# Patient Record
Sex: FEMALE | Race: WHITE | Employment: UNEMPLOYED | URBAN - METROPOLITAN AREA
[De-identification: names, ages, dates, MRNs, and addresses within clinical notes are randomized per-mention and may not be internally consistent; named-entity substitution may affect disease eponyms.]

---

## 2018-07-04 ENCOUNTER — APPOINTMENT (OUTPATIENT)
Dept: GENERAL RADIOLOGY | Age: 48
End: 2018-07-04
Payer: MEDICAID

## 2018-07-04 ENCOUNTER — HOSPITAL ENCOUNTER (EMERGENCY)
Age: 48
Discharge: HOME | End: 2018-07-04
Payer: MEDICAID

## 2018-07-04 VITALS
DIASTOLIC BLOOD PRESSURE: 79 MMHG | BODY MASS INDEX: 41.59 KG/M2 | HEART RATE: 80 BPM | HEIGHT: 62 IN | OXYGEN SATURATION: 96 % | SYSTOLIC BLOOD PRESSURE: 128 MMHG | WEIGHT: 226 LBS | TEMPERATURE: 97.9 F | RESPIRATION RATE: 18 BRPM

## 2018-07-04 DIAGNOSIS — M72.2 PLANTAR FASCIITIS OF LEFT FOOT: ICD-10-CM

## 2018-07-04 DIAGNOSIS — M79.672 PAIN OF LEFT HEEL: ICD-10-CM

## 2018-07-04 DIAGNOSIS — M77.32 HEEL SPUR, LEFT: Primary | ICD-10-CM

## 2018-07-04 PROCEDURE — 6360000002 HC RX W HCPCS

## 2018-07-04 PROCEDURE — 73610 X-RAY EXAM OF ANKLE: CPT

## 2018-07-04 PROCEDURE — 96372 THER/PROPH/DIAG INJ SC/IM: CPT

## 2018-07-04 PROCEDURE — 99283 EMERGENCY DEPT VISIT LOW MDM: CPT

## 2018-07-04 RX ORDER — KETOROLAC TROMETHAMINE 30 MG/ML
30 INJECTION, SOLUTION INTRAMUSCULAR; INTRAVENOUS ONCE
Status: COMPLETED | OUTPATIENT
Start: 2018-07-04 | End: 2018-07-04

## 2018-07-04 RX ORDER — KETOROLAC TROMETHAMINE 30 MG/ML
INJECTION, SOLUTION INTRAMUSCULAR; INTRAVENOUS
Status: COMPLETED
Start: 2018-07-04 | End: 2018-07-04

## 2018-07-04 RX ORDER — NAPROXEN 500 MG/1
500 TABLET ORAL 2 TIMES DAILY
Qty: 14 TABLET | Refills: 0 | Status: SHIPPED | OUTPATIENT
Start: 2018-07-04 | End: 2018-07-11

## 2018-07-04 RX ADMIN — KETOROLAC TROMETHAMINE 30 MG: 30 INJECTION, SOLUTION INTRAMUSCULAR at 18:22

## 2018-07-04 RX ADMIN — KETOROLAC TROMETHAMINE 30 MG: 30 INJECTION, SOLUTION INTRAMUSCULAR; INTRAVENOUS at 18:22

## 2018-07-04 ASSESSMENT — PAIN DESCRIPTION - LOCATION: LOCATION: LEG

## 2018-07-04 ASSESSMENT — PAIN DESCRIPTION - ONSET: ONSET: ON-GOING

## 2018-07-04 ASSESSMENT — PAIN DESCRIPTION - DESCRIPTORS: DESCRIPTORS: ACHING;CONSTANT;DISCOMFORT

## 2018-07-04 ASSESSMENT — PAIN SCALES - GENERAL
PAINLEVEL_OUTOF10: 9
PAINLEVEL_OUTOF10: 9

## 2018-07-04 ASSESSMENT — PAIN DESCRIPTION - PROGRESSION: CLINICAL_PROGRESSION: NOT CHANGED

## 2018-07-04 ASSESSMENT — PAIN DESCRIPTION - PAIN TYPE: TYPE: ACUTE PAIN

## 2018-07-04 ASSESSMENT — PAIN DESCRIPTION - ORIENTATION: ORIENTATION: LEFT

## 2018-07-04 ASSESSMENT — PAIN DESCRIPTION - FREQUENCY: FREQUENCY: CONTINUOUS

## 2018-07-04 NOTE — ED PROVIDER NOTES
[facility]     Department of Emergency Medicine   ED  Provider Note  Admit Date/RoomTime: 7/4/2018  5:17 PM  ED Room: 37/37   Chief Complaint:   Foot Pain (L heel pain that radiates into knee, has been going for a month)    History of Present Illness   Source of history provided by:  patient. History/Exam Limitations: none. Ibeth Hill is a 50 y.o. old female presenting to the emergency department by private vehicle, for Left heel pain which occured 1 month(s) prior to arrival.  Cause of complaint: unknown. She states that at work one month ago she did wear really loose boots and felt the heel hit against her own heel multiple times throughout the day extremity hard. She thought this may have elicited the pain. She also states that she just moved in with her parents and has to walk up and down a lot of steps. She states that this has been aggravating the pain. There has been a history of no prior problems with this area in the past.  Since onset the symptoms have been mild in degree with ability to bear weight, but with some pain. Her pain is aggraveated by stairs and relieved by nothing. Patient states that she has tried Toradol, Naprosyn, and diclofenac with no alleviation of the pain. Patient denies any recent travel, recent surgery, calf pain or swelling, history of blood clots, shortness of breath, fever, chills, diabetes. ROS    Pertinent positives and negatives are stated within HPI, all other systems reviewed and are negative. Past Surgical History:   Procedure Laterality Date    LYMPH NODE DISSECTION  2010    back    TONSILLECTOMY      TOOTH EXTRACTION  2011   Social History:  reports that she quit smoking about 3 years ago. Her smoking use included Cigarettes. She has a 5.00 pack-year smoking history. She has never used smokeless tobacco. She reports that she does not drink alcohol or use drugs. Family History: family history is not on file.    Allergies: Patient has no known allergies. Physical Exam           ED Triage Vitals [07/04/18 1745]   BP Temp Temp Source Pulse Resp SpO2 Height Weight   128/79 97.9 °F (36.6 °C) Oral 80 18 96 % 5' 2\" (1.575 m) 226 lb (102.5 kg)       Oxygen Saturation Interpretation: Normal.    Constitutional:  Alert, development consistent with age. Neck:  Normal ROM. Supple. Ankle:  Left plantar aspect of heel:              Tenderness:  mild. Swelling: None. Deformity: no.             ROM: full range of motion. Skin:  Firm, tough skin to heel without signs of infection, erythema, increased warmth, deformity, callus, ulcer. Neurovascular: Motor deficit: none. Sensory deficit:   none. Pulse deficit: none. Capillary refill: normal.  Knee:  No concerns for DVT on physical examination            Tenderness:  none. Swelling: None. Deformity: no.             ROM: full range of motion. Skin:  no erythema, rash or wounds noted. Foot:              Tenderness:  none. Swelling: None. Deformity: no.             ROM: full range of motion. Skin:  no erythema, rash or wounds noted. Gait:  normal.   Lymphatics: No lymphangitis or adenopathy noted. Neurological:  Oriented. Motor functions intact. Lab / Imaging Results   (All laboratory and radiology results have been personally reviewed by myself)  Labs:  No results found for this visit on 07/04/18. Imaging: All Radiology results interpreted by Radiologist unless otherwise noted. XR ANKLE LEFT (MIN 3 VIEWS)   Final Result   1. No acute fracture or dislocation. ED Course / Medical Decision Making     Medications   ketorolac (TORADOL) injection 30 mg (30 mg Intramuscular Given 7/4/18 5122)        Consults:   None    Procedure(s):   none    MDM:      Patient presents to emergency room for left heel pain for the past month.  She is unsure of

## 2020-11-10 ENCOUNTER — HOSPITAL ENCOUNTER (EMERGENCY)
Age: 50
Discharge: LEFT BEFORE DISCHARGE | End: 2020-11-11

## 2020-11-10 VITALS
WEIGHT: 195 LBS | TEMPERATURE: 97.8 F | SYSTOLIC BLOOD PRESSURE: 127 MMHG | HEART RATE: 88 BPM | OXYGEN SATURATION: 96 % | DIASTOLIC BLOOD PRESSURE: 65 MMHG | BODY MASS INDEX: 35.88 KG/M2 | HEIGHT: 62 IN | RESPIRATION RATE: 16 BRPM

## 2020-11-10 ASSESSMENT — PAIN DESCRIPTION - FREQUENCY: FREQUENCY: CONTINUOUS

## 2020-11-10 ASSESSMENT — PAIN DESCRIPTION - ORIENTATION: ORIENTATION: LEFT

## 2020-11-10 ASSESSMENT — PAIN DESCRIPTION - DESCRIPTORS: DESCRIPTORS: ACHING

## 2020-11-10 ASSESSMENT — PAIN DESCRIPTION - PAIN TYPE: TYPE: ACUTE PAIN

## 2020-11-10 ASSESSMENT — PAIN SCALES - GENERAL: PAINLEVEL_OUTOF10: 10

## 2020-11-10 ASSESSMENT — PAIN DESCRIPTION - LOCATION: LOCATION: THROAT

## 2020-11-10 ASSESSMENT — PAIN - FUNCTIONAL ASSESSMENT: PAIN_FUNCTIONAL_ASSESSMENT: PREVENTS OR INTERFERES SOME ACTIVE ACTIVITIES AND ADLS

## 2021-02-03 ENCOUNTER — HOSPITAL ENCOUNTER (OUTPATIENT)
Dept: CT IMAGING | Age: 51
Discharge: HOME | End: 2021-02-05
Payer: MEDICAID

## 2021-02-03 DIAGNOSIS — S09.90XA INJURY OF HEAD, INITIAL ENCOUNTER: ICD-10-CM

## 2021-02-03 PROCEDURE — 70450 CT HEAD/BRAIN W/O DYE: CPT

## 2021-02-03 PROCEDURE — 72125 CT NECK SPINE W/O DYE: CPT

## 2021-02-03 PROCEDURE — 70486 CT MAXILLOFACIAL W/O DYE: CPT

## 2022-02-16 ENCOUNTER — APPOINTMENT (OUTPATIENT)
Dept: CT IMAGING | Age: 52
End: 2022-02-16
Payer: MEDICARE

## 2022-02-16 ENCOUNTER — HOSPITAL ENCOUNTER (EMERGENCY)
Age: 52
Discharge: HOME | End: 2022-02-16
Attending: EMERGENCY MEDICINE
Payer: MEDICARE

## 2022-02-16 VITALS
OXYGEN SATURATION: 96 % | DIASTOLIC BLOOD PRESSURE: 79 MMHG | BODY MASS INDEX: 36.8 KG/M2 | HEART RATE: 80 BPM | TEMPERATURE: 98.2 F | WEIGHT: 200 LBS | RESPIRATION RATE: 20 BRPM | SYSTOLIC BLOOD PRESSURE: 133 MMHG | HEIGHT: 62 IN

## 2022-02-16 DIAGNOSIS — F91.9 BEHAVIOR DISTURBANCE: ICD-10-CM

## 2022-02-16 DIAGNOSIS — R91.8 LUNG NODULES: ICD-10-CM

## 2022-02-16 DIAGNOSIS — R07.9 CHEST PAIN, UNSPECIFIED TYPE: Primary | ICD-10-CM

## 2022-02-16 LAB
ACETAMINOPHEN LEVEL: <5 MCG/ML (ref 10–30)
ALBUMIN SERPL-MCNC: 4 G/DL (ref 3.5–5.2)
ALP BLD-CCNC: 94 U/L (ref 35–104)
ALT SERPL-CCNC: 19 U/L (ref 0–32)
AMPHETAMINE SCREEN, URINE: NOT DETECTED
ANION GAP SERPL CALCULATED.3IONS-SCNC: 12 MMOL/L (ref 7–16)
AST SERPL-CCNC: 15 U/L (ref 0–31)
BARBITURATE SCREEN URINE: NOT DETECTED
BASOPHILS ABSOLUTE: 0.04 E9/L (ref 0–0.2)
BASOPHILS RELATIVE PERCENT: 0.4 % (ref 0–2)
BENZODIAZEPINE SCREEN, URINE: NOT DETECTED
BILIRUB SERPL-MCNC: <0.2 MG/DL (ref 0–1.2)
BUN BLDV-MCNC: 8 MG/DL (ref 6–20)
CALCIUM SERPL-MCNC: 9.5 MG/DL (ref 8.6–10.2)
CANNABINOID SCREEN URINE: NOT DETECTED
CHLORIDE BLD-SCNC: 108 MMOL/L (ref 98–107)
CO2: 24 MMOL/L (ref 22–29)
COCAINE METABOLITE SCREEN URINE: POSITIVE
CREAT SERPL-MCNC: 0.8 MG/DL (ref 0.5–1)
EKG ATRIAL RATE: 66 BPM
EKG P AXIS: 71 DEGREES
EKG P-R INTERVAL: 176 MS
EKG Q-T INTERVAL: 422 MS
EKG QRS DURATION: 86 MS
EKG QTC CALCULATION (BAZETT): 442 MS
EKG R AXIS: 86 DEGREES
EKG T AXIS: 60 DEGREES
EKG VENTRICULAR RATE: 66 BPM
EOSINOPHILS ABSOLUTE: 0.28 E9/L (ref 0.05–0.5)
EOSINOPHILS RELATIVE PERCENT: 2.8 % (ref 0–6)
ETHANOL: <10 MG/DL (ref 0–0.08)
FENTANYL SCREEN, URINE: NOT DETECTED
GFR AFRICAN AMERICAN: >60
GFR NON-AFRICAN AMERICAN: >60 ML/MIN/1.73
GLUCOSE BLD-MCNC: 203 MG/DL (ref 74–99)
HCG, URINE, POC: NEGATIVE
HCT VFR BLD CALC: 45 % (ref 34–48)
HEMOGLOBIN: 14.5 G/DL (ref 11.5–15.5)
IMMATURE GRANULOCYTES #: 0.04 E9/L
IMMATURE GRANULOCYTES %: 0.4 % (ref 0–5)
INFLUENZA A: NOT DETECTED
INFLUENZA B: NOT DETECTED
LYMPHOCYTES ABSOLUTE: 1.45 E9/L (ref 1.5–4)
LYMPHOCYTES RELATIVE PERCENT: 14.6 % (ref 20–42)
Lab: ABNORMAL
Lab: NORMAL
MCH RBC QN AUTO: 28.9 PG (ref 26–35)
MCHC RBC AUTO-ENTMCNC: 32.2 % (ref 32–34.5)
MCV RBC AUTO: 89.8 FL (ref 80–99.9)
METHADONE SCREEN, URINE: NOT DETECTED
MONOCYTES ABSOLUTE: 0.61 E9/L (ref 0.1–0.95)
MONOCYTES RELATIVE PERCENT: 6.1 % (ref 2–12)
NEGATIVE QC PASS/FAIL: NORMAL
NEUTROPHILS ABSOLUTE: 7.5 E9/L (ref 1.8–7.3)
NEUTROPHILS RELATIVE PERCENT: 75.7 % (ref 43–80)
OPIATE SCREEN URINE: NOT DETECTED
OXYCODONE URINE: NOT DETECTED
PDW BLD-RTO: 13.2 FL (ref 11.5–15)
PHENCYCLIDINE SCREEN URINE: NOT DETECTED
PLATELET # BLD: 295 E9/L (ref 130–450)
PMV BLD AUTO: 9.6 FL (ref 7–12)
POSITIVE QC PASS/FAIL: NORMAL
POTASSIUM SERPL-SCNC: 3.8 MMOL/L (ref 3.5–5)
PRO-BNP: 64 PG/ML (ref 0–125)
RBC # BLD: 5.01 E12/L (ref 3.5–5.5)
SALICYLATE, SERUM: <0.3 MG/DL (ref 0–30)
SARS-COV-2 RNA, RT PCR: NOT DETECTED
SODIUM BLD-SCNC: 144 MMOL/L (ref 132–146)
TOTAL CK: 133 U/L (ref 20–180)
TOTAL PROTEIN: 7.2 G/DL (ref 6.4–8.3)
TRICYCLIC ANTIDEPRESSANTS SCREEN SERUM: NEGATIVE NG/ML
TROPONIN, HIGH SENSITIVITY: 7 NG/L (ref 0–9)
TROPONIN, HIGH SENSITIVITY: <6 NG/L (ref 0–9)
WBC # BLD: 9.9 E9/L (ref 4.5–11.5)

## 2022-02-16 PROCEDURE — 84484 ASSAY OF TROPONIN QUANT: CPT

## 2022-02-16 PROCEDURE — 80053 COMPREHEN METABOLIC PANEL: CPT

## 2022-02-16 PROCEDURE — 83880 ASSAY OF NATRIURETIC PEPTIDE: CPT

## 2022-02-16 PROCEDURE — 85025 COMPLETE CBC W/AUTO DIFF WBC: CPT

## 2022-02-16 PROCEDURE — 6360000004 HC RX CONTRAST MEDICATION: Performed by: RADIOLOGY

## 2022-02-16 PROCEDURE — 74174 CTA ABD&PLVS W/CONTRAST: CPT

## 2022-02-16 PROCEDURE — 70450 CT HEAD/BRAIN W/O DYE: CPT

## 2022-02-16 PROCEDURE — 87636 SARSCOV2 & INF A&B AMP PRB: CPT

## 2022-02-16 PROCEDURE — 80307 DRUG TEST PRSMV CHEM ANLYZR: CPT

## 2022-02-16 PROCEDURE — 2580000003 HC RX 258: Performed by: RADIOLOGY

## 2022-02-16 PROCEDURE — 93010 ELECTROCARDIOGRAM REPORT: CPT | Performed by: INTERNAL MEDICINE

## 2022-02-16 PROCEDURE — 71275 CT ANGIOGRAPHY CHEST: CPT

## 2022-02-16 PROCEDURE — 80179 DRUG ASSAY SALICYLATE: CPT

## 2022-02-16 PROCEDURE — 93005 ELECTROCARDIOGRAM TRACING: CPT | Performed by: NURSE PRACTITIONER

## 2022-02-16 PROCEDURE — 82550 ASSAY OF CK (CPK): CPT

## 2022-02-16 PROCEDURE — 80143 DRUG ASSAY ACETAMINOPHEN: CPT

## 2022-02-16 PROCEDURE — 82077 ASSAY SPEC XCP UR&BREATH IA: CPT

## 2022-02-16 PROCEDURE — 36415 COLL VENOUS BLD VENIPUNCTURE: CPT

## 2022-02-16 PROCEDURE — 99284 EMERGENCY DEPT VISIT MOD MDM: CPT

## 2022-02-16 RX ORDER — SODIUM CHLORIDE 0.9 % (FLUSH) 0.9 %
10 SYRINGE (ML) INJECTION ONCE
Status: COMPLETED | OUTPATIENT
Start: 2022-02-16 | End: 2022-02-16

## 2022-02-16 RX ADMIN — SODIUM CHLORIDE, PRESERVATIVE FREE 10 ML: 5 INJECTION INTRAVENOUS at 16:19

## 2022-02-16 RX ADMIN — IOPAMIDOL 100 ML: 755 INJECTION, SOLUTION INTRAVENOUS at 16:18

## 2022-02-16 ASSESSMENT — ENCOUNTER SYMPTOMS
COUGH: 0
ABDOMINAL DISTENTION: 0
VOMITING: 0
EYE PAIN: 0
DIARRHEA: 0
ABDOMINAL PAIN: 1
BACK PAIN: 0
SORE THROAT: 0
SHORTNESS OF BREATH: 0
NAUSEA: 0

## 2022-02-16 NOTE — ED NOTES
Emergency Department [facility] AN AFFILIATE OF [facility] Biopsychosocial Assessment Note    Chief Complaint: Pt is a 45 y/o female presenting to the ED for medical issues. Once evaluated medical, SW was consulted to complete a psychiatric evaluation. Per ED physician note, \"She also states she is hearing voices. They state they are bad voices telling her things. They tell her to \"jump\". She states did not tell her to kill herself with a tell her to run around and jump off of things. She states nobody cares about her and that is why she feels like the voices are now talking to her. She says she also sees random she running around everywhere. She has not slept in 2 days. \"    MSE: Pt alert and oriented x 4, cooperative, mood depressed, tearful, affect congruent, thought processes clear, normal speech pattern; eye contact good. Pt admits to vague SI. Pt denies HI. Pt admits to [address]. Clinical Summary/History:     Pt states that she lives with her mom and that she is dying of cancer. She states she is depressed because nobody loves her and nobody cares what happens to her. She reports sometimes these feelings intensify and that is when she hears gets paranoid and begins having both auditory and visual hallucinations. Pt states that sometimes she sees shadows and sometimes she sees sheep. She states the voices in her head tell her \"to run and jump\". She states that when these symptoms intensify she gets chest pain and that is why she is in the Emergency room. Pt states that sometimes \"I just want to get out of life\". Pt admits to fleeting and vague suicidal ideations. Per police officers' report, who are by pt's bedside, police were called to the scene by pt's sister who was concerned for her mother's well-being. According to the police officers, pt was heard threatening to kill her mother. The  states, \"The officer heard her say I am going to kill her\". Pt denies that she ever said this.      Pt denies any previous psychiatric hospitalizations and/or history of any suicidal ideations, suicide attempts and/or self-injurious behaviors. Pt states she does get prescribed Effexor for her Borderline Personality Disorder and has been off her medications for about one month. It is recommended that pt be placed on suicide watch once she returns to assisted for precautionary measures. It is also recommended, that should pt be released from assisted within the next three days, that pt will need re-evaluated by the resident psychiatrist within the incarceration system prior to being released. Gender  [] Male [x] Female [] Transgender  [] Other    Sexual Orientation    [x] Heterosexual [] Homosexual [] Bisexual [] Other    Suicidal Behavioral: CSSR-S Complete. [x] Reports:    [] Past [x] Present   [] Denies    Homicidal/ Violent Behavior  [] Reports:   [] Past [] Present   [x] Denies     Violence Risk Screenin. Have you ever engaged in a physical fight? [x]  Yes []  No  2. Have you ever had an order of protection taken out against you? [x]  Yes []  No  3. Have you ever been arrested due to violence? [x]  Yes []  No  4. Have you ever been cruel to animals? [x]  Yes []  No    If any of the above questions are affirmative, please complete these questions:  1. Have you ever thought about hurting someone? []  No  [x]  Yes (Ask the questions listed below)   When?  Did you follow through with the thoughts? []  No  []  Yes - What happened? 2.  Have you ever threatened anyone? []  No  [x]  Yes (Ask the questions listed below)   When and what happened?  Have you ever threatened someone with a gun, knife or other weapon? []  No  []  Yes - When and what happened? 3. Have you ever physically hurt someone? []  No  [x]  Yes (Ask the questions listed below)   When and what happened?     How many times have you physically hurt someone in the past?    Hallucinations/Delusions   [] Reports:   [x] Denies     Substance Use/Alcohol Use/Addiction: SBIRT Screen Complete.    [] Reports:   [x] Denies     Trauma History  [] Reports:  [x] Denies     Collateral Information:       Level of Care/Disposition Plan  [] Home:   [] Outpatient Provider:   [] Crisis Unit:   [] Inpatient Psychiatric Unit:  [x] Other: CHACHA Jensen, [facility] [place]  02/16/22 [address] [place], [place] [address]ACHA, [place]  02/16/22 6678

## 2022-08-13 ENCOUNTER — HOSPITAL ENCOUNTER (EMERGENCY)
Age: 52
Discharge: HOME | End: 2022-08-13
Attending: STUDENT IN AN ORGANIZED HEALTH CARE EDUCATION/TRAINING PROGRAM
Payer: MEDICARE

## 2022-08-13 ENCOUNTER — APPOINTMENT (OUTPATIENT)
Dept: GENERAL RADIOLOGY | Age: 52
End: 2022-08-13
Payer: MEDICARE

## 2022-08-13 VITALS
OXYGEN SATURATION: 94 % | BODY MASS INDEX: 34.96 KG/M2 | HEART RATE: 81 BPM | SYSTOLIC BLOOD PRESSURE: 108 MMHG | DIASTOLIC BLOOD PRESSURE: 70 MMHG | TEMPERATURE: 98.4 F | HEIGHT: 62 IN | RESPIRATION RATE: 16 BRPM | WEIGHT: 190 LBS

## 2022-08-13 DIAGNOSIS — U07.1 COVID-19: ICD-10-CM

## 2022-08-13 DIAGNOSIS — J18.9 PNEUMONIA OF RIGHT LUNG DUE TO INFECTIOUS ORGANISM, UNSPECIFIED PART OF LUNG: Primary | ICD-10-CM

## 2022-08-13 LAB
ALBUMIN SERPL-MCNC: 3.5 G/DL (ref 3.5–5.2)
ALP BLD-CCNC: 145 U/L (ref 35–104)
ALT SERPL-CCNC: 122 U/L (ref 0–32)
ANION GAP SERPL CALCULATED.3IONS-SCNC: 14 MMOL/L (ref 7–16)
AST SERPL-CCNC: 62 U/L (ref 0–31)
BASOPHILS ABSOLUTE: 0.05 E9/L (ref 0–0.2)
BASOPHILS RELATIVE PERCENT: 0.4 % (ref 0–2)
BILIRUB SERPL-MCNC: 0.3 MG/DL (ref 0–1.2)
BILIRUBIN DIRECT: <0.2 MG/DL (ref 0–0.3)
BILIRUBIN, INDIRECT: ABNORMAL MG/DL (ref 0–1)
BUN BLDV-MCNC: 9 MG/DL (ref 6–20)
CALCIUM SERPL-MCNC: 10 MG/DL (ref 8.6–10.2)
CHLORIDE BLD-SCNC: 92 MMOL/L (ref 98–107)
CO2: 30 MMOL/L (ref 22–29)
CREAT SERPL-MCNC: 0.7 MG/DL (ref 0.5–1)
EKG ATRIAL RATE: 77 BPM
EKG P AXIS: 39 DEGREES
EKG P-R INTERVAL: 192 MS
EKG Q-T INTERVAL: 388 MS
EKG QRS DURATION: 88 MS
EKG QTC CALCULATION (BAZETT): 439 MS
EKG R AXIS: 49 DEGREES
EKG T AXIS: 57 DEGREES
EKG VENTRICULAR RATE: 77 BPM
EOSINOPHILS ABSOLUTE: 0.18 E9/L (ref 0.05–0.5)
EOSINOPHILS RELATIVE PERCENT: 1.5 % (ref 0–6)
GFR AFRICAN AMERICAN: >60
GFR NON-AFRICAN AMERICAN: >60 ML/MIN/1.73
GLUCOSE BLD-MCNC: 181 MG/DL (ref 74–99)
HCT VFR BLD CALC: 40 % (ref 34–48)
HEMOGLOBIN: 12.9 G/DL (ref 11.5–15.5)
IMMATURE GRANULOCYTES #: 0.15 E9/L
IMMATURE GRANULOCYTES %: 1.2 % (ref 0–5)
LACTIC ACID: 2.5 MMOL/L (ref 0.5–2.2)
LIPASE: 29 U/L (ref 13–60)
LYMPHOCYTES ABSOLUTE: 1.62 E9/L (ref 1.5–4)
LYMPHOCYTES RELATIVE PERCENT: 13.2 % (ref 20–42)
MAGNESIUM: 2 MG/DL (ref 1.6–2.6)
MCH RBC QN AUTO: 28.5 PG (ref 26–35)
MCHC RBC AUTO-ENTMCNC: 32.3 % (ref 32–34.5)
MCV RBC AUTO: 88.5 FL (ref 80–99.9)
MONOCYTES ABSOLUTE: 0.63 E9/L (ref 0.1–0.95)
MONOCYTES RELATIVE PERCENT: 5.1 % (ref 2–12)
NEUTROPHILS ABSOLUTE: 9.66 E9/L (ref 1.8–7.3)
NEUTROPHILS RELATIVE PERCENT: 78.6 % (ref 43–80)
PDW BLD-RTO: 12.8 FL (ref 11.5–15)
PLATELET # BLD: 429 E9/L (ref 130–450)
PMV BLD AUTO: 9 FL (ref 7–12)
POTASSIUM REFLEX MAGNESIUM: 3.4 MMOL/L (ref 3.5–5)
PRO-BNP: 98 PG/ML (ref 0–125)
RBC # BLD: 4.52 E12/L (ref 3.5–5.5)
SODIUM BLD-SCNC: 136 MMOL/L (ref 132–146)
TOTAL PROTEIN: 7.3 G/DL (ref 6.4–8.3)
TROPONIN, HIGH SENSITIVITY: 7 NG/L (ref 0–9)
WBC # BLD: 12.3 E9/L (ref 4.5–11.5)

## 2022-08-13 PROCEDURE — 83735 ASSAY OF MAGNESIUM: CPT

## 2022-08-13 PROCEDURE — 84484 ASSAY OF TROPONIN QUANT: CPT

## 2022-08-13 PROCEDURE — 83690 ASSAY OF LIPASE: CPT

## 2022-08-13 PROCEDURE — 96365 THER/PROPH/DIAG IV INF INIT: CPT

## 2022-08-13 PROCEDURE — 2580000003 HC RX 258: Performed by: STUDENT IN AN ORGANIZED HEALTH CARE EDUCATION/TRAINING PROGRAM

## 2022-08-13 PROCEDURE — 96375 TX/PRO/DX INJ NEW DRUG ADDON: CPT

## 2022-08-13 PROCEDURE — 94640 AIRWAY INHALATION TREATMENT: CPT

## 2022-08-13 PROCEDURE — 80076 HEPATIC FUNCTION PANEL: CPT

## 2022-08-13 PROCEDURE — 93005 ELECTROCARDIOGRAM TRACING: CPT | Performed by: STUDENT IN AN ORGANIZED HEALTH CARE EDUCATION/TRAINING PROGRAM

## 2022-08-13 PROCEDURE — 83880 ASSAY OF NATRIURETIC PEPTIDE: CPT

## 2022-08-13 PROCEDURE — 83605 ASSAY OF LACTIC ACID: CPT

## 2022-08-13 PROCEDURE — 6360000002 HC RX W HCPCS: Performed by: STUDENT IN AN ORGANIZED HEALTH CARE EDUCATION/TRAINING PROGRAM

## 2022-08-13 PROCEDURE — 94664 DEMO&/EVAL PT USE INHALER: CPT

## 2022-08-13 PROCEDURE — 85025 COMPLETE CBC W/AUTO DIFF WBC: CPT

## 2022-08-13 PROCEDURE — 80048 BASIC METABOLIC PNL TOTAL CA: CPT

## 2022-08-13 PROCEDURE — 2500000003 HC RX 250 WO HCPCS: Performed by: STUDENT IN AN ORGANIZED HEALTH CARE EDUCATION/TRAINING PROGRAM

## 2022-08-13 PROCEDURE — 71045 X-RAY EXAM CHEST 1 VIEW: CPT

## 2022-08-13 PROCEDURE — 6370000000 HC RX 637 (ALT 250 FOR IP): Performed by: STUDENT IN AN ORGANIZED HEALTH CARE EDUCATION/TRAINING PROGRAM

## 2022-08-13 PROCEDURE — 99285 EMERGENCY DEPT VISIT HI MDM: CPT

## 2022-08-13 RX ORDER — 0.9 % SODIUM CHLORIDE 0.9 %
1000 INTRAVENOUS SOLUTION INTRAVENOUS ONCE
Status: COMPLETED | OUTPATIENT
Start: 2022-08-13 | End: 2022-08-13

## 2022-08-13 RX ORDER — BENZONATATE 100 MG/1
100 CAPSULE ORAL 3 TIMES DAILY PRN
Qty: 30 CAPSULE | Refills: 0 | Status: SHIPPED | OUTPATIENT
Start: 2022-08-13 | End: 2022-08-23

## 2022-08-13 RX ORDER — IPRATROPIUM BROMIDE AND ALBUTEROL SULFATE 2.5; .5 MG/3ML; MG/3ML
3 SOLUTION RESPIRATORY (INHALATION) ONCE
Status: COMPLETED | OUTPATIENT
Start: 2022-08-13 | End: 2022-08-13

## 2022-08-13 RX ORDER — POTASSIUM CHLORIDE 1.5 G/1.77G
40 POWDER, FOR SOLUTION ORAL ONCE
Status: DISCONTINUED | OUTPATIENT
Start: 2022-08-13 | End: 2022-08-13 | Stop reason: CLARIF

## 2022-08-13 RX ORDER — BENZONATATE 100 MG/1
100 CAPSULE ORAL ONCE
Status: COMPLETED | OUTPATIENT
Start: 2022-08-13 | End: 2022-08-13

## 2022-08-13 RX ORDER — DOXYCYCLINE HYCLATE 100 MG
100 TABLET ORAL 2 TIMES DAILY
Qty: 20 TABLET | Refills: 0 | Status: SHIPPED | OUTPATIENT
Start: 2022-08-13 | End: 2022-08-13

## 2022-08-13 RX ORDER — KETOROLAC TROMETHAMINE 30 MG/ML
15 INJECTION, SOLUTION INTRAMUSCULAR; INTRAVENOUS ONCE
Status: COMPLETED | OUTPATIENT
Start: 2022-08-13 | End: 2022-08-13

## 2022-08-13 RX ORDER — DOXYCYCLINE HYCLATE 100 MG
100 TABLET ORAL 2 TIMES DAILY
Qty: 20 TABLET | Refills: 0 | Status: SHIPPED | OUTPATIENT
Start: 2022-08-13 | End: 2022-08-23

## 2022-08-13 RX ORDER — ALBUTEROL SULFATE 90 UG/1
2 AEROSOL, METERED RESPIRATORY (INHALATION) 4 TIMES DAILY PRN
Qty: 18 G | Refills: 0 | Status: SHIPPED | OUTPATIENT
Start: 2022-08-13

## 2022-08-13 RX ORDER — OXYCODONE HYDROCHLORIDE AND ACETAMINOPHEN 5; 325 MG/1; MG/1
1 TABLET ORAL ONCE
Status: COMPLETED | OUTPATIENT
Start: 2022-08-13 | End: 2022-08-13

## 2022-08-13 RX ORDER — CEFDINIR 300 MG/1
300 CAPSULE ORAL 2 TIMES DAILY
Qty: 20 CAPSULE | Refills: 0 | Status: SHIPPED | OUTPATIENT
Start: 2022-08-13 | End: 2022-08-23

## 2022-08-13 RX ADMIN — DOXYCYCLINE 100 MG: 100 INJECTION, POWDER, LYOPHILIZED, FOR SOLUTION INTRAVENOUS at 15:17

## 2022-08-13 RX ADMIN — POTASSIUM BICARBONATE 40 MEQ: 782 TABLET, EFFERVESCENT ORAL at 15:08

## 2022-08-13 RX ADMIN — KETOROLAC TROMETHAMINE 15 MG: 30 INJECTION, SOLUTION INTRAMUSCULAR at 15:10

## 2022-08-13 RX ADMIN — WATER 2000 MG: 1 INJECTION INTRAMUSCULAR; INTRAVENOUS; SUBCUTANEOUS at 15:09

## 2022-08-13 RX ADMIN — IPRATROPIUM BROMIDE AND ALBUTEROL SULFATE 3 AMPULE: 2.5; .5 SOLUTION RESPIRATORY (INHALATION) at 13:07

## 2022-08-13 RX ADMIN — BENZONATATE 100 MG: 100 CAPSULE ORAL at 15:08

## 2022-08-13 RX ADMIN — OXYCODONE AND ACETAMINOPHEN 1 TABLET: 5; 325 TABLET ORAL at 16:52

## 2022-08-13 RX ADMIN — SODIUM CHLORIDE 1000 ML: 9 INJECTION, SOLUTION INTRAVENOUS at 13:32

## 2022-08-13 ASSESSMENT — ENCOUNTER SYMPTOMS
SORE THROAT: 0
EYE REDNESS: 0
WHEEZING: 0
DIARRHEA: 0
SINUS PRESSURE: 0
BACK PAIN: 0
NAUSEA: 0
EYE PAIN: 0
COUGH: 1
EYE DISCHARGE: 0
SHORTNESS OF BREATH: 1
VOMITING: 0
ABDOMINAL DISTENTION: 0

## 2022-08-13 ASSESSMENT — PAIN SCALES - GENERAL: PAINLEVEL_OUTOF10: 7

## 2022-08-13 ASSESSMENT — PAIN DESCRIPTION - LOCATION: LOCATION: CHEST;RIB CAGE

## 2022-08-13 ASSESSMENT — PAIN DESCRIPTION - ORIENTATION: ORIENTATION: LEFT;RIGHT

## 2022-08-13 ASSESSMENT — PAIN DESCRIPTION - DESCRIPTORS: DESCRIPTORS: ACHING;DISCOMFORT

## 2022-08-13 ASSESSMENT — PAIN - FUNCTIONAL ASSESSMENT
PAIN_FUNCTIONAL_ASSESSMENT: NONE - DENIES PAIN
PAIN_FUNCTIONAL_ASSESSMENT: NONE - DENIES PAIN

## 2022-08-13 NOTE — ED PROVIDER NOTES
Ramya Strickland is a 46 y.o. female with a PMHx significant for HTN who presents for evaluation of shortness of breath, cough, beginning prior to arrival.  The complaint has been persistent, moderate in severity, and worsened by exertion. The patient states that 7 days ago she swabbed herself with covid and was positive. Notes that 2 weeks ago she was having congestion, chills, and fevers. Notes for the last week she has been short of breath, which is made worse with going up and down the steps. She feels like her heart takes \"a pancake flip, like a fluttering thing. \" Also feels that she has been wheezing on the right sided. Has stomach pain when she coughs. Notes Tmax of 101F two days ago. She did start taking the antiviral five days ago. She did have diarrhea 2 weeks ago when this all started. Per EMS she was 92% on room air, she was given supplemental oxygen in route. Patient states that she was vaccinated with moderna and the booster. Notes the cough is productive of green and yellow mucous. The history is provided by the patient and medical records. Review of Systems   Constitutional:  Positive for fatigue and fever. Negative for chills. HENT:  Positive for congestion. Negative for ear pain, sinus pressure and sore throat. Eyes:  Negative for pain, discharge and redness. Respiratory:  Positive for cough and shortness of breath. Negative for wheezing. Cardiovascular:  Negative for chest pain. Gastrointestinal:  Negative for abdominal distention, diarrhea, nausea and vomiting. Genitourinary:  Negative for dysuria and frequency. Musculoskeletal:  Negative for arthralgias and back pain. Skin:  Negative for rash and wound. Neurological:  Positive for headaches. Negative for weakness. Hematological:  Negative for adenopathy. All other systems reviewed and are negative. Physical Exam  Vitals and nursing note reviewed.    Constitutional:       General: She is not in acute distress. Appearance: Normal appearance. She is well-developed. She is not ill-appearing. HENT:      Head: Normocephalic and atraumatic. Right Ear: External ear normal.      Left Ear: External ear normal.   Eyes:      General:         Right eye: No discharge. Left eye: No discharge. Extraocular Movements: Extraocular movements intact. Conjunctiva/sclera: Conjunctivae normal.   Cardiovascular:      Rate and Rhythm: Normal rate and regular rhythm. Heart sounds: Normal heart sounds. No murmur heard. Pulmonary:      Effort: Pulmonary effort is normal.      Breath sounds: Decreased air movement present. No stridor. Wheezing (expiratory) present. Chest:      Chest wall: No tenderness. Abdominal:      General: There is no distension. Palpations: Abdomen is soft. There is no mass. Tenderness: There is no abdominal tenderness. Hernia: No hernia is present. Musculoskeletal:      Cervical back: Normal range of motion and neck supple. Skin:     General: Skin is warm and dry. Coloration: Skin is not jaundiced or pale. Neurological:      Mental Status: She is alert and oriented to person, place, and time. Cranial Nerves: No cranial nerve deficit. Coordination: Coordination normal.        Procedures     MDM     Amount and/or Complexity of Data Reviewed  Clinical lab tests: reviewed  Tests in the radiology section of CPT®: reviewed  Tests in the medicine section of CPT®: reviewed    57-year-old female presents to the emergency department with complaint cough congestion COVID-19 approximately 2 weeks ago. She is noted on imaging to have right-sided pneumonia was started on antibiotics here. Saturation was well on room air in the room and she was ambulated with no desaturation stayed at 94% with ambulation. Patient does have slight elevated white count most likely due to her pneumonia. Main laboratory work-up is reassuring.   She will be discharged home on oral antibiotics and is advised to follow-up with her family doctor. She is agreeable this plan. Patient safe for discharge from the emergency department. Did advise on return precautions to the emergency department. Did also advise follow-up with her primary care physician. Patient agreeable with the plan moving forward. ED Course as of 08/13/22 1727   Sat Aug 13, 2022   1310 EKG: This EKG is signed by emergency department physician. Rate: 77  Rhythm: Sinus  Interpretation: No acute ST elevation depression sinus rhythm normal axis stable intervals  Comparison: stable as compared to patient's most recent EKG      [CB]   1612 Patient saturating 94% with ambulation [CB]      ED Course User Index  [CB] Piedad Martins MD        ED Course as of 08/13/22 1727   Sat Aug 13, 2022   1310 EKG: This EKG is signed by emergency department physician. Rate: 77  Rhythm: Sinus  Interpretation: No acute ST elevation depression sinus rhythm normal axis stable intervals  Comparison: stable as compared to patient's most recent EKG      [CB]   1612 Patient saturating 94% with ambulation [CB]      ED Course User Index  [CB] Piedad Martins MD       --------------------------------------------- PAST HISTORY ---------------------------------------------  Past Medical History:  has a past medical history of Asthma, Cellulitis, Diverticulitis, Diverticulosis, and HTN (hypertension). Past Surgical History:  has a past surgical history that includes Tooth Extraction (2011); lymph node dissection (2010); and Tonsillectomy. Social History:  reports that she quit smoking about 7 years ago. Her smoking use included cigarettes. She has a 5.00 pack-year smoking history. She has never used smokeless tobacco. She reports that she does not drink alcohol and does not use drugs. Family History: family history is not on file. The patients home medications have been reviewed.     Allergies: Patient has no known allergies.     -------------------------------------------------- RESULTS -------------------------------------------------  Labs:  Results for orders placed or performed during the hospital encounter of 08/13/22   CBC with Auto Differential   Result Value Ref Range    WBC 12.3 (H) 4.5 - 11.5 E9/L    RBC 4.52 3.50 - 5.50 E12/L    Hemoglobin 12.9 11.5 - 15.5 g/dL    Hematocrit 40.0 34.0 - 48.0 %    MCV 88.5 80.0 - 99.9 fL    MCH 28.5 26.0 - 35.0 pg    MCHC 32.3 32.0 - 34.5 %    RDW 12.8 11.5 - 15.0 fL    Platelets 903 369 - 780 E9/L    MPV 9.0 7.0 - 12.0 fL    Neutrophils % 78.6 43.0 - 80.0 %    Immature Granulocytes % 1.2 0.0 - 5.0 %    Lymphocytes % 13.2 (L) 20.0 - 42.0 %    Monocytes % 5.1 2.0 - 12.0 %    Eosinophils % 1.5 0.0 - 6.0 %    Basophils % 0.4 0.0 - 2.0 %    Neutrophils Absolute 9.66 (H) 1.80 - 7.30 E9/L    Immature Granulocytes # 0.15 E9/L    Lymphocytes Absolute 1.62 1.50 - 4.00 E9/L    Monocytes Absolute 0.63 0.10 - 0.95 E9/L    Eosinophils Absolute 0.18 0.05 - 0.50 E9/L    Basophils Absolute 0.05 0.00 - 0.20 M8/N   Basic Metabolic Panel w/ Reflex to MG   Result Value Ref Range    Sodium 136 132 - 146 mmol/L    Potassium reflex Magnesium 3.4 (L) 3.5 - 5.0 mmol/L    Chloride 92 (L) 98 - 107 mmol/L    CO2 30 (H) 22 - 29 mmol/L    Anion Gap 14 7 - 16 mmol/L    Glucose 181 (H) 74 - 99 mg/dL    BUN 9 6 - 20 mg/dL    Creatinine 0.7 0.5 - 1.0 mg/dL    GFR Non-African American >60 >=60 mL/min/1.73    GFR African American >60     Calcium 10.0 8.6 - 10.2 mg/dL   Hepatic Function Panel   Result Value Ref Range    Total Protein 7.3 6.4 - 8.3 g/dL    Albumin 3.5 3.5 - 5.2 g/dL    Alkaline Phosphatase 145 (H) 35 - 104 U/L     (H) 0 - 32 U/L    AST 62 (H) 0 - 31 U/L    Total Bilirubin 0.3 0.0 - 1.2 mg/dL    Bilirubin, Direct <0.2 0.0 - 0.3 mg/dL    Bilirubin, Indirect see below 0.0 - 1.0 mg/dL   Lipase   Result Value Ref Range    Lipase 29 13 - 60 U/L   Troponin   Result Value Ref Range    Troponin, High Sensitivity 7 0 - 9 ng/L   Brain Natriuretic Peptide   Result Value Ref Range    Pro-BNP 98 0 - 125 pg/mL   Lactic Acid   Result Value Ref Range    Lactic Acid 2.5 (H) 0.5 - 2.2 mmol/L   Magnesium   Result Value Ref Range    Magnesium 2.0 1.6 - 2.6 mg/dL   EKG 12 Lead   Result Value Ref Range    Ventricular Rate 77 BPM    Atrial Rate 77 BPM    P-R Interval 192 ms    QRS Duration 88 ms    Q-T Interval 388 ms    QTc Calculation (Bazett) 439 ms    P Axis 39 degrees    R Axis 49 degrees    T Axis 57 degrees       Radiology:  XR CHEST PORTABLE   Final Result   1. Right upper lobe and right lower lobe pneumonia more prominent within the   right lower lobe.             ------------------------- NURSING NOTES AND VITALS REVIEWED ---------------------------  Date / Time Roomed:  8/13/2022 12:38 PM  ED Bed Assignment:  26/26    The nursing notes within the ED encounter and vital signs as below have been reviewed. /70   Pulse 81   Temp 98.4 °F (36.9 °C) (Oral)   Resp 16   Ht 5' 2\" (1.575 m)   Wt 190 lb (86.2 kg)   SpO2 94%   BMI 34.75 kg/m²   Oxygen Saturation Interpretation: Normal      ------------------------------------------ PROGRESS NOTES ------------------------------------------  5:27 PM EDT  I have spoken with the patient and discussed todays results, in addition to providing specific details for the plan of care and counseling regarding the diagnosis and prognosis. Their questions are answered at this time and they are agreeable with the plan. I discussed at length with them reasons for immediate return here for re evaluation. They will followup with their primary care physician by calling their office on Monday.      --------------------------------- ADDITIONAL PROVIDER NOTES ---------------------------------  At this time the patient is without objective evidence of an acute process requiring hospitalization or inpatient management.   They have remained hemodynamically stable throughout their entire ED visit and are stable for discharge with outpatient follow-up. The plan has been discussed in detail and they are aware of the specific conditions for emergent return, as well as the importance of follow-up. New Prescriptions    ALBUTEROL SULFATE HFA (VENTOLIN HFA) 108 (90 BASE) MCG/ACT INHALER    Inhale 2 puffs into the lungs 4 times daily as needed for Wheezing    BENZONATATE (TESSALON) 100 MG CAPSULE    Take 1 capsule by mouth 3 times daily as needed for Cough    CEFDINIR (OMNICEF) 300 MG CAPSULE    Take 1 capsule by mouth in the morning and 1 capsule before bedtime. Do all this for 10 days. DOXYCYCLINE HYCLATE (VIBRA-TABS) 100 MG TABLET    Take 1 tablet by mouth in the morning and 1 tablet before bedtime. Do all this for 10 days. Diagnosis:  1. Pneumonia of right lung due to infectious organism, unspecified part of lung    2. COVID-19        Disposition:  Patient's disposition: Discharge to home  Patient's condition is stable.          Kadi Clinton MD  Resident  08/13/22 8418

## 2022-08-28 ENCOUNTER — HOSPITAL ENCOUNTER (EMERGENCY)
Age: 52
Discharge: HOME | End: 2022-08-28

## 2022-08-28 VITALS
HEART RATE: 73 BPM | RESPIRATION RATE: 20 BRPM | OXYGEN SATURATION: 95 % | DIASTOLIC BLOOD PRESSURE: 86 MMHG | TEMPERATURE: 97.9 F | SYSTOLIC BLOOD PRESSURE: 147 MMHG

## 2023-01-10 ENCOUNTER — HOSPITAL ENCOUNTER (EMERGENCY)
Age: 53
Discharge: HOME | End: 2023-01-10
Attending: EMERGENCY MEDICINE
Payer: MEDICARE

## 2023-01-10 VITALS
RESPIRATION RATE: 18 BRPM | SYSTOLIC BLOOD PRESSURE: 136 MMHG | TEMPERATURE: 98.2 F | BODY MASS INDEX: 36.21 KG/M2 | OXYGEN SATURATION: 100 % | WEIGHT: 198 LBS | HEART RATE: 82 BPM | DIASTOLIC BLOOD PRESSURE: 84 MMHG

## 2023-01-10 DIAGNOSIS — N30.01 ACUTE CYSTITIS WITH HEMATURIA: Primary | ICD-10-CM

## 2023-01-10 LAB
BACTERIA: ABNORMAL /HPF
BILIRUBIN URINE: NEGATIVE
BLOOD, URINE: ABNORMAL
CLARITY: ABNORMAL
COLOR: YELLOW
EPITHELIAL CELLS, UA: ABNORMAL /HPF
GLUCOSE URINE: NEGATIVE MG/DL
INFLUENZA A: NOT DETECTED
INFLUENZA B: NOT DETECTED
KETONES, URINE: NEGATIVE MG/DL
LEUKOCYTE ESTERASE, URINE: ABNORMAL
NITRITE, URINE: NEGATIVE
PH UA: 7 (ref 5–9)
PROTEIN UA: 30 MG/DL
RBC UA: >20 /HPF (ref 0–2)
SARS-COV-2 RNA, RT PCR: NOT DETECTED
SPECIFIC GRAVITY UA: 1.02 (ref 1–1.03)
UROBILINOGEN, URINE: 0.2 E.U./DL
WBC UA: >20 /HPF (ref 0–5)

## 2023-01-10 PROCEDURE — 99283 EMERGENCY DEPT VISIT LOW MDM: CPT

## 2023-01-10 PROCEDURE — 6370000000 HC RX 637 (ALT 250 FOR IP)

## 2023-01-10 PROCEDURE — 81001 URINALYSIS AUTO W/SCOPE: CPT

## 2023-01-10 PROCEDURE — 87636 SARSCOV2 & INF A&B AMP PRB: CPT

## 2023-01-10 PROCEDURE — 87077 CULTURE AEROBIC IDENTIFY: CPT

## 2023-01-10 PROCEDURE — 87186 SC STD MICRODIL/AGAR DIL: CPT

## 2023-01-10 PROCEDURE — 87088 URINE BACTERIA CULTURE: CPT

## 2023-01-10 RX ORDER — CEPHALEXIN 500 MG/1
500 CAPSULE ORAL 2 TIMES DAILY
Qty: 14 CAPSULE | Refills: 0 | Status: SHIPPED | OUTPATIENT
Start: 2023-01-10 | End: 2023-01-10

## 2023-01-10 RX ORDER — ATORVASTATIN CALCIUM 40 MG/1
40 TABLET, FILM COATED ORAL DAILY
COMMUNITY

## 2023-01-10 RX ORDER — CEFDINIR 300 MG/1
300 CAPSULE ORAL ONCE
Status: COMPLETED | OUTPATIENT
Start: 2023-01-10 | End: 2023-01-10

## 2023-01-10 RX ORDER — CEFDINIR 300 MG/1
300 CAPSULE ORAL 2 TIMES DAILY
Qty: 10 CAPSULE | Refills: 0 | Status: SHIPPED | OUTPATIENT
Start: 2023-01-10 | End: 2023-01-15

## 2023-01-10 RX ADMIN — CEFDINIR 300 MG: 300 CAPSULE ORAL at 12:33

## 2023-01-10 ASSESSMENT — PAIN - FUNCTIONAL ASSESSMENT
PAIN_FUNCTIONAL_ASSESSMENT: 0-10
PAIN_FUNCTIONAL_ASSESSMENT: 0-10

## 2023-01-10 ASSESSMENT — PAIN DESCRIPTION - ONSET: ONSET: SUDDEN

## 2023-01-10 ASSESSMENT — PAIN SCALES - GENERAL
PAINLEVEL_OUTOF10: 7
PAINLEVEL_OUTOF10: 10

## 2023-01-10 ASSESSMENT — PAIN DESCRIPTION - DESCRIPTORS: DESCRIPTORS: PRESSURE

## 2023-01-10 ASSESSMENT — PAIN DESCRIPTION - FREQUENCY: FREQUENCY: CONTINUOUS

## 2023-01-10 ASSESSMENT — PAIN DESCRIPTION - LOCATION: LOCATION: PENIS

## 2023-01-10 ASSESSMENT — PAIN DESCRIPTION - PAIN TYPE: TYPE: ACUTE PAIN

## 2023-01-10 NOTE — ED PROVIDER NOTES
[address] ENCOUNTER        Pt Name: Kayla Lopez  MRN: [id number]  [place] 1970  Date of evaluation: 1/10/2023  Provider: Liz Somers MD  PCP: No primary care provider on file. Note Started: 8:00 PM EST 1/12/23    CHIEF COMPLAINT       Chief Complaint   Patient presents with    Urinary Retention     \"It trickles out. \"  Last normal urination was a couple days ago. Cloudy urine. HISTORY OF PRESENT ILLNESS: 1 or more Elements   History From: Patient    Limitations to history : None    Kayla Lopez is a 45 y.o. female   with PMHx of HTN presents with dysuria and decrease frequency. States for the past 2 days urine has been cloudy. Suprapubic pressure. Associated with body aches and congestion. Describes symptoms moderate in severity with no alleviating or exacerbating factors. Denies any fever, chills, n/v, headache, dizziness, vision changes, neck tenderness or stiffness, weakness, cp, palpitations, leg swelling/tenderness, sob, cough, diarrhea, constipation. Nursing Notes were all reviewed and agreed with or any disagreements were addressed in the HPI.    ROS:   Pertinent positives and negatives are stated within HPI, all other systems reviewed and are negative.    --------------------------------------------- PAST HISTORY ---------------------------------------------  Past Medical History:  has a past medical history of Asthma, Cellulitis, Diverticulitis, Diverticulosis, HTN (hypertension), and Hyperlipidemia. Past Surgical History:  has a past surgical history that includes Tooth Extraction (2011); lymph node dissection (2010); and Tonsillectomy. Social History:  reports that she quit smoking about 8 years ago. Her smoking use included cigarettes. She has a 5.00 pack-year smoking history.  She has never used smokeless tobacco. She reports that she does not drink alcohol and does not use drugs. Family History: family history is not on file. The patients home medications have been reviewed. Allergies: Amoxicillin    ---------------------------------------------------PHYSICAL EXAM--------------------------------------        Constitutional/General: Alert and oriented x3, well appearing, non toxic in NAD  Head: Normocephalic and atraumatic  Mouth: Oropharynx clear, handling secretions, no trismus  Neck: Supple, full ROM,  Pulmonary: Lungs clear to auscultation bilaterally, no wheezes, rales, or rhonchi. Not in respiratory distress  Cardiovascular:  Regular rate. Regular rhythm. No murmurs  Chest: no chest wall tenderness  Abdomen: Suprapubic tenderness, soft. Non distended. No rebound, guarding, or rigidity. No pulsatile masses appreciated. No CVA tenderness. Musculoskeletal: Moves all extremities x 4. Neurovascularly intact. Warm and well perfused, no clubbing, cyanosis, or edema. Compartments are soft and compressible. Capillary refill <3 seconds. Skin: warm and dry. No rashes. Neurologic: GCS 15, no gross focal neurologic deficits  Psych: Normal Affect    -------------------------------------------------- RESULTS -------------------------------------------------  I have personally reviewed all laboratory and imaging results for this patient. Results are listed below.      LABS:  Results for orders placed or performed during the hospital encounter of 01/10/23   COVID-19 & Influenza Combo    Specimen: Nasopharyngeal Swab   Result Value Ref Range    SARS-CoV-2 RNA, RT PCR NOT DETECTED NOT DETECTED    INFLUENZA A NOT DETECTED NOT DETECTED    INFLUENZA B NOT DETECTED NOT DETECTED   Culture, Urine    Specimen: Urine, clean catch   Result Value Ref Range    Urine Culture, Routine <10,000 CFU/mL  Mixed gram positive organisms   (A)     Organism Escherichia coli (A)     Urine Culture, Routine >100,000 CFU/ml        Susceptibility    Escherichia coli - BACTERIAL SUSCEPTIBILITY PANEL BY ITALIA amoxicillin-clavulanate <=^2 Sensitive mcg/mL     ceFAZolin <=^4 Sensitive mcg/mL     cefepime <=^0.12 Sensitive mcg/mL     cefotaxime <=^0.25 Sensitive mcg/mL     cefOXitin <=^4 Sensitive mcg/mL     cefTAZidime-avibactam <=^0.12 Sensitive mcg/mL     gentamicin <=^1 Sensitive mcg/mL     levofloxacin <=^0.12 Sensitive mcg/mL     meropenem <=^0.25 Sensitive mcg/mL     nitrofurantoin <=^16 Sensitive mcg/mL     piperacillin-tazobactam <=^4 Sensitive mcg/mL     trimethoprim-sulfamethoxazole <=^20 Sensitive mcg/mL   Urinalysis with Microscopic   Result Value Ref Range    Color, UA Yellow Straw/Yellow    Clarity, UA CLOUDY (A) Clear    Glucose, Ur Negative Negative mg/dL    Bilirubin Urine Negative Negative    Ketones, Urine Negative Negative mg/dL    Specific Gravity, UA 1.020 1.005 - 1.030    Blood, Urine LARGE (A) Negative    pH, UA 7.0 5.0 - 9.0    Protein, UA 30 (A) Negative mg/dL    Urobilinogen, Urine 0.2 <2.0 E.U./dL    Nitrite, Urine Negative Negative    Leukocyte Esterase, Urine MODERATE (A) Negative    WBC, UA >20 (A) 0 - 5 /HPF    RBC, UA >20 0 - 2 /HPF    Epithelial Cells, UA MODERATE /HPF    Bacteria, UA MODERATE (A) None Seen /HPF       RADIOLOGY:   Non-plain film images such as CT, Ultrasound and MRI are read by the radiologist. Plain radiographic images are visualized and preliminarily interpreted by the ED Provider with the below findings:        Interpretation per the Radiologist below, if available at the time of this note:    No orders to display     No results found. No results found. Procedures:      ------------------------- NURSING NOTES AND VITALS REVIEWED ---------------------------   The nursing notes within the ED encounter and vital signs as below have been reviewed by myself and ED attending.   /84   Pulse 82   Temp 98.2 °F (36.8 °C) (Oral)   Resp 18   Wt 198 lb (89.8 kg)   SpO2 100%   BMI 36.21 kg/m²   Oxygen Saturation Interpretation: Normal    The patients available past medical records and past encounters were reviewed by myself and ED attending        ------------------------------ ED COURSE/MEDICAL DECISION MAKING----------------------    Medical Decision Making/Differential Diagnosis:    CC/HPI Summary, Pertinent Physical Exam Findings, Social Determinants of health, Records Reviewed, DDx, testing done/not done, ED Course, Reassessment, disposition considerations/shared decision making with patient, consults, disposition:      Medical Decision Making/ED COURSE:    Chronic Conditions affecting care:    has a past medical history of Asthma, Cellulitis, Diverticulitis, Diverticulosis, HTN (hypertension) (10/03/2011), and Hyperlipidemia. Willard Kumar is a 45 y.o. female   with PMHx of HTN presents with dysuria and decrease frequency. Vital signs /84   Pulse 82   Temp 98.2 °F (36.8 °C) (Oral)   Resp 18   Wt 198 lb (89.8 kg)   SpO2 100%   BMI 36.21 kg/m²  Patient was placed on the cardiac monitor. Myself and ED attending interpreted findings. Rhythm - Sinus. While in the ED patient was hemodynamically stable, afebrile, nontoxic-appearing, in no respiratory distress. Physical exam remarkable for suprapubic tenderness but soft non distended abdomen with positive bowel sounds. Working diagnoses include but not limited to UTI, kidney stones, URI, constipation and dehydration. Labs interpreted by me. Urinalysis remarkable for UTI, cultures sent. COVID and flu negative. Patient administered Omnicef oral. On reevaluation patient's abdominal exam was unchanged. Patient would like to go home. Pt will be d/c  with omnicef prescription and will follow up with her PCP. She is educated on signs and symptoms that require emergent evaluation. Pt is advised to return to the ED if her symptoms change or worsen. If her pain persists, pt may need further evaluation. Pt is agreeable to plan and all questions have been answered at this time. Counseling:    The emergency provider has spoken with the patient and discussed todays results, in addition to providing specific details for the plan of care and counseling regarding the diagnosis and prognosis. Questions are answered at this time and they are agreeable with the plan. ED Course as of 01/12/23 2000   Tue Jong 10, 2023   1232 Patient is a 68-year-old female presenting with burning with urination for the last 2 days. She also reports nasal congestion which has been an ongoing issue, she is concerned she has a sinus infection. She denies fevers, chest pain, shortness of breath abdominal pain, nausea, and vomiting. States she has pressure in her lower abdomen when she urinates only. In the ED, she was hemodynamically stable and well-appearing. She was afebrile. Heart and lung sounds were normal.  She had no abdominal tenderness. Urinalysis appears to be infected. I reviewed the chart. No recent urine cultures. Urine culture has been sent today. We will treat with JAMIN MIXON which will also cover possible sinus infection. Advised PCP follow-up. Patient is stable vital signs and is overall well-appearing with no systemic signs of illness. Sepsis is not suspected. No back pain or hematuria. Kidney stone is not suspected. Strict ED return precautions discussed. Advised PCP follow-up [JA]      ED Course User Index  [JA] Rosales Mccoy MD       Medications   cefdinir (OMNICEF) capsule 300 mg (300 mg Oral Given 1/10/23 1233)       Discharge Medication List as of 1/10/2023 12:27 PM        START taking these medications    Details   cefdinir (OMNICEF) 300 MG capsule Take 1 capsule by mouth 2 times daily for 5 days, Disp-10 capsule, R-0Print               Discussion with Other Profesionals : None    Social Determinants : None    Records Reviewed : None      CONSULTS: none       --------------------------------- IMPRESSION AND DISPOSITION ---------------------------------    IMPRESSION  1.  Acute cystitis with hematuria        DISPOSITION  Disposition: Discharge to home  Patient condition is stable        NOTE: This report was transcribed using voice recognition software.  Every effort was made to ensure accuracy; however, inadvertent computerized transcription errors may be present        Colette Cortez MD  Resident  01/12/23 2008

## 2023-01-12 LAB
ORGANISM: ABNORMAL
URINE CULTURE, ROUTINE: ABNORMAL
URINE CULTURE, ROUTINE: ABNORMAL

## 2023-02-24 ENCOUNTER — APPOINTMENT (OUTPATIENT)
Dept: ULTRASOUND IMAGING | Age: 53
DRG: 392 | End: 2023-02-24
Payer: COMMERCIAL

## 2023-02-24 ENCOUNTER — HOSPITAL ENCOUNTER (INPATIENT)
Age: 53
LOS: 2 days | Discharge: HOME | DRG: 392 | End: 2023-02-27
Attending: EMERGENCY MEDICINE | Admitting: FAMILY MEDICINE
Payer: COMMERCIAL

## 2023-02-24 ENCOUNTER — APPOINTMENT (OUTPATIENT)
Dept: CT IMAGING | Age: 53
DRG: 392 | End: 2023-02-24
Payer: COMMERCIAL

## 2023-02-24 DIAGNOSIS — R10.84 GENERALIZED ABDOMINAL PAIN: ICD-10-CM

## 2023-02-24 DIAGNOSIS — K52.9 ENTERITIS: Primary | ICD-10-CM

## 2023-02-24 DIAGNOSIS — N83.209 CYST OF OVARY, UNSPECIFIED LATERALITY: ICD-10-CM

## 2023-02-24 LAB
ALBUMIN SERPL-MCNC: 4 G/DL (ref 3.5–5.2)
ALP BLD-CCNC: 106 U/L (ref 35–104)
ALT SERPL-CCNC: 49 U/L (ref 0–32)
ANION GAP SERPL CALCULATED.3IONS-SCNC: 11 MMOL/L (ref 7–16)
AST SERPL-CCNC: 66 U/L (ref 0–31)
BASOPHILS ABSOLUTE: 0.06 E9/L (ref 0–0.2)
BASOPHILS RELATIVE PERCENT: 0.3 % (ref 0–2)
BILIRUB SERPL-MCNC: 1.1 MG/DL (ref 0–1.2)
BILIRUBIN DIRECT: 0.5 MG/DL (ref 0–0.3)
BILIRUBIN, INDIRECT: 0.6 MG/DL (ref 0–1)
BUN BLDV-MCNC: 8 MG/DL (ref 6–20)
CALCIUM SERPL-MCNC: 9.4 MG/DL (ref 8.6–10.2)
CHLORIDE BLD-SCNC: 97 MMOL/L (ref 98–107)
CO2: 28 MMOL/L (ref 22–29)
CREAT SERPL-MCNC: 1.1 MG/DL (ref 0.5–1)
EOSINOPHILS ABSOLUTE: 0.02 E9/L (ref 0.05–0.5)
EOSINOPHILS RELATIVE PERCENT: 0.1 % (ref 0–6)
GFR SERPL CREATININE-BSD FRML MDRD: 60 ML/MIN/1.73
GLUCOSE BLD-MCNC: 135 MG/DL (ref 74–99)
HCT VFR BLD CALC: 46.1 % (ref 34–48)
HEMOGLOBIN: 15.5 G/DL (ref 11.5–15.5)
IMMATURE GRANULOCYTES #: 0.14 E9/L
IMMATURE GRANULOCYTES %: 0.7 % (ref 0–5)
LACTIC ACID: 1.3 MMOL/L (ref 0.5–2.2)
LIPASE: 13 U/L (ref 13–60)
LYMPHOCYTES ABSOLUTE: 0.73 E9/L (ref 1.5–4)
LYMPHOCYTES RELATIVE PERCENT: 3.4 % (ref 20–42)
MCH RBC QN AUTO: 29.4 PG (ref 26–35)
MCHC RBC AUTO-ENTMCNC: 33.6 % (ref 32–34.5)
MCV RBC AUTO: 87.3 FL (ref 80–99.9)
MONOCYTES ABSOLUTE: 1.13 E9/L (ref 0.1–0.95)
MONOCYTES RELATIVE PERCENT: 5.3 % (ref 2–12)
NEUTROPHILS ABSOLUTE: 19.17 E9/L (ref 1.8–7.3)
NEUTROPHILS RELATIVE PERCENT: 90.2 % (ref 43–80)
PDW BLD-RTO: 13.5 FL (ref 11.5–15)
PLATELET # BLD: 280 E9/L (ref 130–450)
PMV BLD AUTO: 9.8 FL (ref 7–12)
POTASSIUM SERPL-SCNC: 3.2 MMOL/L (ref 3.5–5)
PRO-BNP: 79 PG/ML (ref 0–125)
RBC # BLD: 5.28 E12/L (ref 3.5–5.5)
SODIUM BLD-SCNC: 136 MMOL/L (ref 132–146)
TOTAL PROTEIN: 7.3 G/DL (ref 6.4–8.3)
TROPONIN, HIGH SENSITIVITY: 12 NG/L (ref 0–9)
TROPONIN, HIGH SENSITIVITY: 13 NG/L (ref 0–9)
WBC # BLD: 21.3 E9/L (ref 4.5–11.5)

## 2023-02-24 PROCEDURE — 83690 ASSAY OF LIPASE: CPT

## 2023-02-24 PROCEDURE — 85025 COMPLETE CBC W/AUTO DIFF WBC: CPT

## 2023-02-24 PROCEDURE — 80048 BASIC METABOLIC PNL TOTAL CA: CPT

## 2023-02-24 PROCEDURE — 6360000004 HC RX CONTRAST MEDICATION: Performed by: RADIOLOGY

## 2023-02-24 PROCEDURE — 93005 ELECTROCARDIOGRAM TRACING: CPT | Performed by: EMERGENCY MEDICINE

## 2023-02-24 PROCEDURE — 6360000002 HC RX W HCPCS: Performed by: EMERGENCY MEDICINE

## 2023-02-24 PROCEDURE — 76830 TRANSVAGINAL US NON-OB: CPT

## 2023-02-24 PROCEDURE — 74177 CT ABD & PELVIS W/CONTRAST: CPT

## 2023-02-24 PROCEDURE — 96361 HYDRATE IV INFUSION ADD-ON: CPT

## 2023-02-24 PROCEDURE — 2580000003 HC RX 258: Performed by: EMERGENCY MEDICINE

## 2023-02-24 PROCEDURE — 83605 ASSAY OF LACTIC ACID: CPT

## 2023-02-24 PROCEDURE — 83880 ASSAY OF NATRIURETIC PEPTIDE: CPT

## 2023-02-24 PROCEDURE — 96375 TX/PRO/DX INJ NEW DRUG ADDON: CPT

## 2023-02-24 PROCEDURE — 96374 THER/PROPH/DIAG INJ IV PUSH: CPT

## 2023-02-24 PROCEDURE — 84484 ASSAY OF TROPONIN QUANT: CPT

## 2023-02-24 PROCEDURE — 99285 EMERGENCY DEPT VISIT HI MDM: CPT

## 2023-02-24 PROCEDURE — 80076 HEPATIC FUNCTION PANEL: CPT

## 2023-02-24 RX ORDER — MORPHINE SULFATE 4 MG/ML
4 INJECTION, SOLUTION INTRAMUSCULAR; INTRAVENOUS ONCE
Status: COMPLETED | OUTPATIENT
Start: 2023-02-24 | End: 2023-02-24

## 2023-02-24 RX ORDER — ONDANSETRON 2 MG/ML
4 INJECTION INTRAMUSCULAR; INTRAVENOUS ONCE
Status: COMPLETED | OUTPATIENT
Start: 2023-02-24 | End: 2023-02-24

## 2023-02-24 RX ORDER — 0.9 % SODIUM CHLORIDE 0.9 %
1000 INTRAVENOUS SOLUTION INTRAVENOUS ONCE
Status: COMPLETED | OUTPATIENT
Start: 2023-02-24 | End: 2023-02-24

## 2023-02-24 RX ADMIN — IOPAMIDOL 75 ML: 755 INJECTION, SOLUTION INTRAVENOUS at 20:13

## 2023-02-24 RX ADMIN — MORPHINE SULFATE 4 MG: 4 INJECTION, SOLUTION INTRAMUSCULAR; INTRAVENOUS at 20:02

## 2023-02-24 RX ADMIN — SODIUM CHLORIDE 1000 ML: 9 INJECTION, SOLUTION INTRAVENOUS at 20:24

## 2023-02-24 RX ADMIN — ONDANSETRON 4 MG: 2 INJECTION INTRAMUSCULAR; INTRAVENOUS at 20:02

## 2023-02-24 ASSESSMENT — PAIN - FUNCTIONAL ASSESSMENT: PAIN_FUNCTIONAL_ASSESSMENT: 0-10

## 2023-02-24 ASSESSMENT — PAIN SCALES - GENERAL: PAINLEVEL_OUTOF10: 10

## 2023-02-24 ASSESSMENT — PAIN DESCRIPTION - LOCATION: LOCATION: ABDOMEN

## 2023-02-24 NOTE — ED PROVIDER NOTES
[address]        Pt Name: Abdoulaye Ribeiro  MRN: [id number]  [place] 1970  Date of evaluation: 2/24/2023  Provider: Sagrario Cotton DO  PCP: No primary care provider on file. Note Started: 5:45 PM EST 2/24/23    CHIEF COMPLAINT       Chief Complaint   Patient presents with    Abdominal Pain     Started this morning-generalized abd pain; states she hasn't had a BM in \"days\"; keeps falling asleep during triage, used crack yesterday       HISTORY OF PRESENT ILLNESS: 1 or more Elements   History From: Patient     Limitations to history : None    Abdoulaye Ribeiro is a 45 y.o. female who presents with concern for generalized acute onset abdominal pain. She notes last time she had a bowel movement was approximately a week ago, she has been nauseous throughout the day. She denies that she passed out from the pain earlier in the day. She has not been having fevers or chills, no numbness, aches, tingling, no difficulties with urination. She never felt like this in the past.  She says that she does not generally use drugs but one of her friends had crack the other day and she did smoke some. No other acute complaints. Nursing Notes were all reviewed and agreed with or any disagreements were addressed in the HPI. REVIEW OF SYSTEMS :      Positives and Pertinent negatives as per HPI.      SURGICAL HISTORY     Past Surgical History:   Procedure Laterality Date    LYMPH NODE DISSECTION  2010    back    TONSILLECTOMY      TOOTH EXTRACTION  2011       CURRENTMEDICATIONS       Previous Medications    ATORVASTATIN (LIPITOR) 40 MG TABLET    Take 40 mg by mouth daily    HYDROCHLOROTHIAZIDE (MICROZIDE) 12.5 MG CAPSULE    Take 25 mg by mouth every morning     LEVOCETIRIZINE DIHYDROCHLORIDE (XYZAL PO)    Take by mouth    VENLAFAXINE (EFFEXOR) 75 MG TABLET    Take 150 mg by mouth daily        ALLERGIES Amoxicillin    FAMILYHISTORY     History reviewed. No pertinent family history. SOCIAL HISTORY       Social History     Tobacco Use    Smoking status: Former     Packs/day: 0.50     Years: 10.00     Pack years: 5.00     Types: Cigarettes     Quit date: 2014     Years since quittin.2    Smokeless tobacco: Never   Vaping Use    Vaping Use: Never used   Substance Use Topics    Alcohol use: No    Drug use: No       SCREENINGS        [place] Coma Scale  Eye Opening: Spontaneous  Best Verbal Response: Oriented  Best Motor Response: Obeys commands  [place] Coma Scale Score: 15                CIWA Assessment  BP: 105/64  Heart Rate: 82           PHYSICAL EXAM  1 or more Elements     ED Triage Vitals [23 1713]   BP Temp Temp src Heart Rate Resp SpO2 Height Weight   133/66 -- -- 83 19 95 % 5' 4\" (1.626 m) 190 lb (86.2 kg)       Constitutional/General: Alert and oriented x3  Head: Normocephalic and atraumatic  Eyes: PERRL, EOMI, conjunctiva normal, sclera non icteric  ENT:  Oropharynx clear, handling secretions  Neck: Supple, full ROM, no stridor  Respiratory: Lungs clear to auscultation bilaterally, no wheezes, rales, or rhonchi. Not in respiratory distress  Cardiovascular:  Regular rate. Regular rhythm. 2+ distal pulses. Equal extremity pulses. Chest: No chest wall tenderness  GI: Abdomen generally tender, distended, guarding in all quadrants   musculoskeletal: Moves all extremities x 4. Warm and well perfused, no cyanosis, no edema. Capillary refill <3 seconds  Integument: skin warm and dry. No rashes.    Neurologic: GCS 15, no focal deficits, symmetric strength 5/5 in the upper and lower extremities bilaterally  Psychiatric: Normal Affect      DIAGNOSTIC RESULTS   LABS:    Labs Reviewed   CBC WITH AUTO DIFFERENTIAL - Abnormal; Notable for the following components:       Result Value    WBC 21.3 (*)     Neutrophils % 90.2 (*)     Lymphocytes % 3.4 (*)     Neutrophils Absolute 19.17 (*)     Lymphocytes Absolute 0.73 (*)     Monocytes Absolute 1.13 (*)     Eosinophils Absolute 0.02 (*)     All other components within normal limits   BASIC METABOLIC PANEL - Abnormal; Notable for the following components:    Potassium 3.2 (*)     Chloride 97 (*)     Glucose 135 (*)     Creatinine 1.1 (*)     All other components within normal limits   HEPATIC FUNCTION PANEL - Abnormal; Notable for the following components:    Alkaline Phosphatase 106 (*)     ALT 49 (*)     AST 66 (*)     Bilirubin, Direct 0.5 (*)     All other components within normal limits   TROPONIN - Abnormal; Notable for the following components:    Troponin, High Sensitivity 12 (*)     All other components within normal limits   URINALYSIS WITH MICROSCOPIC - Abnormal; Notable for the following components:    Bilirubin Urine SMALL (*)     Blood, Urine SMALL (*)     Leukocyte Esterase, Urine TRACE (*)     Bacteria, UA FEW (*)     All other components within normal limits   TROPONIN - Abnormal; Notable for the following components:    Troponin, High Sensitivity 13 (*)     All other components within normal limits   CULTURE, STOOL   GIARDIA ANTIGEN   FECAL LEUKOCYTES   LACTIC ACID   LIPASE   BRAIN NATRIURETIC PEPTIDE   URINE DRUG SCREEN   CRYPTOSPORIDIUM ANTIGEN, STOOL   ROTAVIRUS ANTIGEN, STOOL       As interpreted by me, the above displayed labs are abnormal. All other labs obtained during this visit were within normal range or not returned as of this dictation. RADIOLOGY:   Non-plain film images such as CT, Ultrasound and MRI are read by the radiologist. Plain radiographic images are visualized and preliminarily interpreted by the ED Provider with the below findings:    Interpretation per the Radiologist below, if available at the time of this note:    US NON OB TRANSVAGINAL   Final Result   2.8 cm right ovarian cyst.  The remainder of the exam is unremarkable. CT ABDOMEN PELVIS W IV CONTRAST Additional Contrast? None   Final Result   1.   Presence of mild the hepatomegaly with indication for fat infiltration of   the liver. Borderline size spleen. 2.  Presence of fluid contents with mild dilatation of a more proximal small   bowel segments at the level of the jejunum, presently nonspecific. Fluid   contents are also seen in the right-sided colon. Cannot exclude a component   of mild enteritis. 3.  Complex lesion in the left ovary. Final interpretation can require   correlation with histopathology. Cannot exclude ovarian neoplasm. Further   gynecological investigation is recommended. No results found. No results found. PROCEDURES   Unless otherwise noted below, none    PAST MEDICAL HISTORY/Chronic Conditions Affecting Care      has a past medical history of Asthma, Cellulitis, Diverticulitis, Diverticulosis, HTN (hypertension) (10/03/2011), and Hyperlipidemia.      EMERGENCY DEPARTMENT COURSE    Vitals:    Vitals:    02/24/23 1713 02/24/23 1945 02/24/23 2145 02/25/23 0014   BP: 133/66 135/85 106/65 105/64   Pulse: 83 92 95 82   Resp: 19 16 18 22   Temp:  98 °F (36.7 °C)     SpO2: 95% 95% 96% 96%   Weight: 190 lb (86.2 kg)      Height: 5' 4\" (1.626 m)          Patient was given the following medications:  Medications   cefTRIAXone (ROCEPHIN) 2,000 mg in sterile water 20 mL IV syringe (has no administration in time range)   metronidazole (FLAGYL) 500 mg in 0.9% NaCl 100 mL IVPB premix (has no administration in time range)   oxyCODONE (ROXICODONE) immediate release tablet 5 mg (has no administration in time range)     Or   oxyCODONE HCl (OXY-IR) immediate release tablet 10 mg (has no administration in time range)   0.9 % sodium chloride infusion (has no administration in time range)   morphine sulfate (PF) injection 4 mg (has no administration in time range)   0.9 % sodium chloride bolus (has no administration in time range)   trimethobenzamide (TIGAN) injection 200 mg (has no administration in time range)   0.9 % sodium chloride bolus (0 mLs IntraVENous Stopped 2/24/23 2330)   morphine sulfate (PF) injection 4 mg (4 mg IntraVENous Given 2/24/23 2002)   ondansetron [facility]) injection 4 mg (4 mg IntraVENous Given 2/24/23 2002)   iopamidol (ISOVUE-370) 76 % injection 75 mL (75 mLs IntraVENous Given 2/24/23 2013)   potassium bicarb-citric acid (EFFER-K) effervescent tablet 40 mEq (40 mEq Oral Given 2/25/23 0015)       Medical Decision Making/Differential Diagnosis:  CC/HPI Summary, Social Determinants of health, Records Reviewed, DDx, testing done/not done, ED Course, Reassessment, disposition considerations/shared decision making with patient, consults, disposition:        CC/HPI Summary, DDx, ED Course, Reassessment, Tests Considered, Patient expectation:   77-year-old female with past medical history of hypertension, hyperlipidemia, asthma presenting today with concern for generalized abdominal pain. It is ongoing throughout the day, she says that it hurt so bad she practically passed out earlier in the day. Associated with nausea and vomiting. On arrival to emergency department physical exam is concerning as she has generalized tenderness and distention with obvious guarding. Hemodynamically stable on arrival to emergency department. Differential diagnosis to include but not limited to obstruction, perforation, ischemia, gastroenteritis. EKG without acute ST elevation or other concerning rhythm changes. She is symptomatically with IV fluid bolus, IV morphine, IV Zofran. She is lab work with unremarkable delta troponin, no evidence of UTI, liver function very slightly elevated, hypokalemia that was repleted p.o. in the emergency department, lactic acid within normal limits. Lab work concerning for leukocytosis to 21.3, CT abdomen pelvis demonstrating concern for enteritis. With patient's presentation of extreme abdominal pain, leukocytosis, general surgery was consulted.   They evaluated the patient at bedside and note that there is not a surgical indication at this time.  With her intractable pain, will be admitted for serial abdominal exams and redosed with IV morphine and IM Tigan as QTc was prolonged on EKG.  Discussed with internal medicine and they will accept for admission and management.    ED Course as of 02/25/23 0132   Fri Feb 24, 2023 2230 Discussed with general surgery resident and they will come to the emergency department to evaluate patient at bedside [MM]   Sat Feb 25, 2023   0114 Patient reevaluated, still in severe abdominal pain.  She lives in a car and is not anywhere to go, with her white count intractable pain, will admit for observation. [MM]   0121 EKG:  This EKG is signed and interpreted by me.    Rate: 91  Rhythm: Sinus  Interpretation: no acute changes, no ST elevations, normal axis, QTc 565  Comparison: stable as compared to patient's most recent EKG on 8/13/2022   [MM]   0129 Discussed with Dr. Juarez and he will accept for admission and management. [MM]      ED Course User Index  [MM] Mattie Colmenares DO        Social Determinants affecting Dx or Tx: Homeless    CONSULTS: (Who and What was discussed)  IP CONSULT TO GENERAL SURGERY  IP CONSULT TO INTERNAL MEDICINE    FINAL IMPRESSION      1. Generalized abdominal pain    2. Cyst of ovary, unspecified laterality          DISPOSITION/PLAN     DISPOSITION Admitted 02/25/2023 01:30:47 AM    (Please note that portions of this note were completed with a voice recognition program.  Efforts were made to edit the dictations but occasionally words are mis-transcribed.)    Mattie Colmenares DO (electronically signed)            Mattie Colmenares DO  Resident  02/25/23 0132

## 2023-02-25 PROBLEM — F14.10 COCAINE ABUSE (HCC): Status: ACTIVE | Noted: 2023-02-25

## 2023-02-25 PROBLEM — R10.84 GENERALIZED ABDOMINAL PAIN: Status: ACTIVE | Noted: 2023-02-25

## 2023-02-25 PROBLEM — K52.9 ENTERITIS: Status: ACTIVE | Noted: 2023-02-25

## 2023-02-25 PROBLEM — K59.00 FECAL RETENTION: Status: ACTIVE | Noted: 2023-02-25

## 2023-02-25 LAB
ALBUMIN SERPL-MCNC: 3.4 G/DL (ref 3.5–5.2)
ALP BLD-CCNC: 86 U/L (ref 35–104)
ALT SERPL-CCNC: 43 U/L (ref 0–32)
AMORPHOUS: ABNORMAL
AMPHETAMINE SCREEN, URINE: NOT DETECTED
ANION GAP SERPL CALCULATED.3IONS-SCNC: 12 MMOL/L (ref 7–16)
AST SERPL-CCNC: 41 U/L (ref 0–31)
BACTERIA: ABNORMAL /HPF
BARBITURATE SCREEN URINE: NOT DETECTED
BASOPHILS ABSOLUTE: 0.04 E9/L (ref 0–0.2)
BASOPHILS RELATIVE PERCENT: 0.3 % (ref 0–2)
BENZODIAZEPINE SCREEN, URINE: NOT DETECTED
BILIRUB SERPL-MCNC: 0.8 MG/DL (ref 0–1.2)
BILIRUBIN URINE: ABNORMAL
BLOOD, URINE: ABNORMAL
BUN BLDV-MCNC: 9 MG/DL (ref 6–20)
CALCIUM SERPL-MCNC: 8.6 MG/DL (ref 8.6–10.2)
CANNABINOID SCREEN URINE: NOT DETECTED
CHLORIDE BLD-SCNC: 102 MMOL/L (ref 98–107)
CLARITY: CLEAR
CO2: 24 MMOL/L (ref 22–29)
COCAINE METABOLITE SCREEN URINE: POSITIVE
COLOR: YELLOW
CREAT SERPL-MCNC: 1 MG/DL (ref 0.5–1)
EOSINOPHILS ABSOLUTE: 0.05 E9/L (ref 0.05–0.5)
EOSINOPHILS RELATIVE PERCENT: 0.3 % (ref 0–6)
EPITHELIAL CELLS, UA: ABNORMAL /HPF
FENTANYL SCREEN, URINE: NOT DETECTED
GFR SERPL CREATININE-BSD FRML MDRD: >60 ML/MIN/1.73
GLUCOSE BLD-MCNC: 130 MG/DL (ref 74–99)
GLUCOSE URINE: NEGATIVE MG/DL
HCT VFR BLD CALC: 41.3 % (ref 34–48)
HEMOGLOBIN: 13.8 G/DL (ref 11.5–15.5)
HYALINE CASTS: ABNORMAL /LPF (ref 0–2)
IMMATURE GRANULOCYTES #: 0.11 E9/L
IMMATURE GRANULOCYTES %: 0.7 % (ref 0–5)
KETONES, URINE: NEGATIVE MG/DL
LEUKOCYTE ESTERASE, URINE: ABNORMAL
LYMPHOCYTES ABSOLUTE: 1.74 E9/L (ref 1.5–4)
LYMPHOCYTES RELATIVE PERCENT: 11 % (ref 20–42)
Lab: ABNORMAL
MAGNESIUM: 1.8 MG/DL (ref 1.6–2.6)
MCH RBC QN AUTO: 28.3 PG (ref 26–35)
MCHC RBC AUTO-ENTMCNC: 33.4 % (ref 32–34.5)
MCV RBC AUTO: 84.6 FL (ref 80–99.9)
METHADONE SCREEN, URINE: NOT DETECTED
MONOCYTES ABSOLUTE: 1.11 E9/L (ref 0.1–0.95)
MONOCYTES RELATIVE PERCENT: 7 % (ref 2–12)
NEUTROPHILS ABSOLUTE: 12.84 E9/L (ref 1.8–7.3)
NEUTROPHILS RELATIVE PERCENT: 80.7 % (ref 43–80)
NITRITE, URINE: NEGATIVE
OPIATE SCREEN URINE: POSITIVE
OXYCODONE URINE: NOT DETECTED
PDW BLD-RTO: 13.6 FL (ref 11.5–15)
PH UA: 5.5 (ref 5–9)
PHENCYCLIDINE SCREEN URINE: NOT DETECTED
PLATELET # BLD: 257 E9/L (ref 130–450)
PMV BLD AUTO: 9.6 FL (ref 7–12)
POTASSIUM REFLEX MAGNESIUM: 3.3 MMOL/L (ref 3.5–5)
PROTEIN UA: ABNORMAL MG/DL
RBC # BLD: 4.88 E12/L (ref 3.5–5.5)
RBC UA: ABNORMAL /HPF (ref 0–2)
ROTAVIRUS ANTIGEN: NORMAL
SODIUM BLD-SCNC: 138 MMOL/L (ref 132–146)
SPECIFIC GRAVITY UA: <=1.005 (ref 1–1.03)
TOTAL PROTEIN: 6.8 G/DL (ref 6.4–8.3)
UROBILINOGEN, URINE: 1 E.U./DL
WBC # BLD: 15.9 E9/L (ref 4.5–11.5)
WBC UA: ABNORMAL /HPF (ref 0–5)

## 2023-02-25 PROCEDURE — 80053 COMPREHEN METABOLIC PANEL: CPT

## 2023-02-25 PROCEDURE — 2500000003 HC RX 250 WO HCPCS: Performed by: FAMILY MEDICINE

## 2023-02-25 PROCEDURE — 6370000000 HC RX 637 (ALT 250 FOR IP): Performed by: EMERGENCY MEDICINE

## 2023-02-25 PROCEDURE — 2500000003 HC RX 250 WO HCPCS

## 2023-02-25 PROCEDURE — 80307 DRUG TEST PRSMV CHEM ANLYZR: CPT

## 2023-02-25 PROCEDURE — 85025 COMPLETE CBC W/AUTO DIFF WBC: CPT

## 2023-02-25 PROCEDURE — 6360000002 HC RX W HCPCS: Performed by: SURGERY

## 2023-02-25 PROCEDURE — 87425 ROTAVIRUS AG IA: CPT

## 2023-02-25 PROCEDURE — 6370000000 HC RX 637 (ALT 250 FOR IP): Performed by: FAMILY MEDICINE

## 2023-02-25 PROCEDURE — 87045 FECES CULTURE AEROBIC BACT: CPT

## 2023-02-25 PROCEDURE — 2580000003 HC RX 258

## 2023-02-25 PROCEDURE — 99221 1ST HOSP IP/OBS SF/LOW 40: CPT | Performed by: SURGERY

## 2023-02-25 PROCEDURE — 2580000003 HC RX 258: Performed by: SURGERY

## 2023-02-25 PROCEDURE — 2580000003 HC RX 258: Performed by: FAMILY MEDICINE

## 2023-02-25 PROCEDURE — 83735 ASSAY OF MAGNESIUM: CPT

## 2023-02-25 PROCEDURE — 87329 GIARDIA AG IA: CPT

## 2023-02-25 PROCEDURE — 6360000002 HC RX W HCPCS

## 2023-02-25 PROCEDURE — 89055 LEUKOCYTE ASSESSMENT FECAL: CPT

## 2023-02-25 PROCEDURE — 6360000002 HC RX W HCPCS: Performed by: FAMILY MEDICINE

## 2023-02-25 PROCEDURE — 6360000002 HC RX W HCPCS: Performed by: EMERGENCY MEDICINE

## 2023-02-25 PROCEDURE — 1200000000 HC SEMI PRIVATE

## 2023-02-25 PROCEDURE — 81001 URINALYSIS AUTO W/SCOPE: CPT

## 2023-02-25 PROCEDURE — 87328 CRYPTOSPORIDIUM AG IA: CPT

## 2023-02-25 RX ORDER — SODIUM CHLORIDE 9 MG/ML
INJECTION, SOLUTION INTRAVENOUS CONTINUOUS
Status: DISCONTINUED | OUTPATIENT
Start: 2023-02-25 | End: 2023-02-26

## 2023-02-25 RX ORDER — 0.9 % SODIUM CHLORIDE 0.9 %
1000 INTRAVENOUS SOLUTION INTRAVENOUS ONCE
Status: DISCONTINUED | OUTPATIENT
Start: 2023-02-25 | End: 2023-02-25

## 2023-02-25 RX ORDER — MORPHINE SULFATE 4 MG/ML
4 INJECTION, SOLUTION INTRAMUSCULAR; INTRAVENOUS ONCE
Status: COMPLETED | OUTPATIENT
Start: 2023-02-25 | End: 2023-02-25

## 2023-02-25 RX ORDER — SODIUM CHLORIDE 9 MG/ML
INJECTION, SOLUTION INTRAVENOUS CONTINUOUS
Status: DISCONTINUED | OUTPATIENT
Start: 2023-02-25 | End: 2023-02-25

## 2023-02-25 RX ORDER — ACETAMINOPHEN 325 MG/1
650 TABLET ORAL EVERY 6 HOURS PRN
Status: DISCONTINUED | OUTPATIENT
Start: 2023-02-25 | End: 2023-02-27

## 2023-02-25 RX ORDER — SODIUM CHLORIDE 0.9 % (FLUSH) 0.9 %
10 SYRINGE (ML) INJECTION PRN
Status: DISCONTINUED | OUTPATIENT
Start: 2023-02-25 | End: 2023-02-27 | Stop reason: HOSPADM

## 2023-02-25 RX ORDER — PROMETHAZINE HYDROCHLORIDE 12.5 MG/1
12.5 TABLET ORAL EVERY 6 HOURS PRN
Status: DISCONTINUED | OUTPATIENT
Start: 2023-02-25 | End: 2023-02-25

## 2023-02-25 RX ORDER — ATORVASTATIN CALCIUM 40 MG/1
40 TABLET, FILM COATED ORAL DAILY
Status: DISCONTINUED | OUTPATIENT
Start: 2023-02-25 | End: 2023-02-27 | Stop reason: HOSPADM

## 2023-02-25 RX ORDER — SODIUM CHLORIDE, SODIUM LACTATE, POTASSIUM CHLORIDE, AND CALCIUM CHLORIDE .6; .31; .03; .02 G/100ML; G/100ML; G/100ML; G/100ML
1000 INJECTION, SOLUTION INTRAVENOUS ONCE
Status: COMPLETED | OUTPATIENT
Start: 2023-02-25 | End: 2023-02-25

## 2023-02-25 RX ORDER — ONDANSETRON 2 MG/ML
4 INJECTION INTRAMUSCULAR; INTRAVENOUS EVERY 6 HOURS PRN
Status: DISCONTINUED | OUTPATIENT
Start: 2023-02-25 | End: 2023-02-25

## 2023-02-25 RX ORDER — MORPHINE SULFATE 4 MG/ML
4 INJECTION, SOLUTION INTRAMUSCULAR; INTRAVENOUS EVERY 4 HOURS PRN
Status: DISCONTINUED | OUTPATIENT
Start: 2023-02-25 | End: 2023-02-25

## 2023-02-25 RX ORDER — MORPHINE SULFATE 4 MG/ML
4 INJECTION, SOLUTION INTRAMUSCULAR; INTRAVENOUS
Status: DISCONTINUED | OUTPATIENT
Start: 2023-02-25 | End: 2023-02-26

## 2023-02-25 RX ORDER — GABAPENTIN 400 MG/1
800 CAPSULE ORAL 3 TIMES DAILY
COMMUNITY

## 2023-02-25 RX ORDER — MELOXICAM 15 MG/1
15 TABLET ORAL PRN
COMMUNITY

## 2023-02-25 RX ORDER — SODIUM CHLORIDE 0.9 % (FLUSH) 0.9 %
10 SYRINGE (ML) INJECTION EVERY 12 HOURS SCHEDULED
Status: DISCONTINUED | OUTPATIENT
Start: 2023-02-25 | End: 2023-02-27 | Stop reason: HOSPADM

## 2023-02-25 RX ORDER — ACETAMINOPHEN 650 MG/1
650 SUPPOSITORY RECTAL EVERY 6 HOURS PRN
Status: DISCONTINUED | OUTPATIENT
Start: 2023-02-25 | End: 2023-02-27

## 2023-02-25 RX ORDER — HYDROCHLOROTHIAZIDE 25 MG/1
25 TABLET ORAL EVERY MORNING
Status: DISCONTINUED | OUTPATIENT
Start: 2023-02-25 | End: 2023-02-27 | Stop reason: HOSPADM

## 2023-02-25 RX ORDER — METRONIDAZOLE 500 MG/100ML
500 INJECTION, SOLUTION INTRAVENOUS EVERY 8 HOURS
Status: DISCONTINUED | OUTPATIENT
Start: 2023-02-25 | End: 2023-02-27

## 2023-02-25 RX ORDER — SODIUM CHLORIDE 9 MG/ML
INJECTION, SOLUTION INTRAVENOUS PRN
Status: DISCONTINUED | OUTPATIENT
Start: 2023-02-25 | End: 2023-02-27 | Stop reason: HOSPADM

## 2023-02-25 RX ORDER — OXYCODONE HYDROCHLORIDE 5 MG/1
5 TABLET ORAL EVERY 4 HOURS PRN
Status: DISCONTINUED | OUTPATIENT
Start: 2023-02-25 | End: 2023-02-26

## 2023-02-25 RX ORDER — ENOXAPARIN SODIUM 100 MG/ML
40 INJECTION SUBCUTANEOUS DAILY
Status: DISCONTINUED | OUTPATIENT
Start: 2023-02-25 | End: 2023-02-27 | Stop reason: HOSPADM

## 2023-02-25 RX ORDER — ONDANSETRON 2 MG/ML
4 INJECTION INTRAMUSCULAR; INTRAVENOUS ONCE
Status: DISCONTINUED | OUTPATIENT
Start: 2023-02-25 | End: 2023-02-25

## 2023-02-25 RX ORDER — VENLAFAXINE HYDROCHLORIDE 150 MG/1
150 CAPSULE, EXTENDED RELEASE ORAL DAILY
Status: DISCONTINUED | OUTPATIENT
Start: 2023-02-26 | End: 2023-02-27 | Stop reason: HOSPADM

## 2023-02-25 RX ORDER — POLYETHYLENE GLYCOL 3350 17 G/17G
17 POWDER, FOR SOLUTION ORAL DAILY PRN
Status: DISCONTINUED | OUTPATIENT
Start: 2023-02-25 | End: 2023-02-27 | Stop reason: HOSPADM

## 2023-02-25 RX ORDER — OXYCODONE HYDROCHLORIDE 10 MG/1
10 TABLET ORAL EVERY 4 HOURS PRN
Status: DISCONTINUED | OUTPATIENT
Start: 2023-02-25 | End: 2023-02-26

## 2023-02-25 RX ADMIN — METRONIDAZOLE 500 MG: 500 INJECTION, SOLUTION INTRAVENOUS at 02:29

## 2023-02-25 RX ADMIN — METRONIDAZOLE 500 MG: 500 INJECTION, SOLUTION INTRAVENOUS at 10:54

## 2023-02-25 RX ADMIN — MORPHINE SULFATE 4 MG: 4 INJECTION, SOLUTION INTRAMUSCULAR; INTRAVENOUS at 08:58

## 2023-02-25 RX ADMIN — SODIUM CHLORIDE: 9 INJECTION, SOLUTION INTRAVENOUS at 06:03

## 2023-02-25 RX ADMIN — METRONIDAZOLE 500 MG: 500 INJECTION, SOLUTION INTRAVENOUS at 18:56

## 2023-02-25 RX ADMIN — SODIUM CHLORIDE 950 ML: 9 INJECTION, SOLUTION INTRAVENOUS at 15:46

## 2023-02-25 RX ADMIN — POTASSIUM BICARBONATE 40 MEQ: 782 TABLET, EFFERVESCENT ORAL at 00:15

## 2023-02-25 RX ADMIN — ENOXAPARIN SODIUM 40 MG: 100 INJECTION SUBCUTANEOUS at 08:57

## 2023-02-25 RX ADMIN — SODIUM CHLORIDE: 9 INJECTION, SOLUTION INTRAVENOUS at 02:29

## 2023-02-25 RX ADMIN — SODIUM CHLORIDE, POTASSIUM CHLORIDE, SODIUM LACTATE AND CALCIUM CHLORIDE 1000 ML: 600; 310; 30; 20 INJECTION, SOLUTION INTRAVENOUS at 08:57

## 2023-02-25 RX ADMIN — OXYCODONE HYDROCHLORIDE 10 MG: 10 TABLET ORAL at 05:29

## 2023-02-25 RX ADMIN — MORPHINE SULFATE 4 MG: 4 INJECTION, SOLUTION INTRAMUSCULAR; INTRAVENOUS at 02:26

## 2023-02-25 RX ADMIN — ACETAMINOPHEN 650 MG: 325 TABLET ORAL at 21:27

## 2023-02-25 RX ADMIN — OXYCODONE HYDROCHLORIDE 10 MG: 10 TABLET ORAL at 21:28

## 2023-02-25 RX ADMIN — OXYCODONE HYDROCHLORIDE 10 MG: 10 TABLET ORAL at 14:25

## 2023-02-25 RX ADMIN — CEFTRIAXONE 2000 MG: 2 INJECTION, POWDER, FOR SOLUTION INTRAMUSCULAR; INTRAVENOUS at 02:25

## 2023-02-25 RX ADMIN — TRIMETHOBENZAMIDE HYDROCHLORIDE 200 MG: 100 INJECTION INTRAMUSCULAR at 02:27

## 2023-02-25 ASSESSMENT — PAIN DESCRIPTION - PAIN TYPE: TYPE: ACUTE PAIN

## 2023-02-25 ASSESSMENT — PAIN SCALES - GENERAL
PAINLEVEL_OUTOF10: 6
PAINLEVEL_OUTOF10: 7
PAINLEVEL_OUTOF10: 10
PAINLEVEL_OUTOF10: 10
PAINLEVEL_OUTOF10: 9

## 2023-02-25 ASSESSMENT — PAIN DESCRIPTION - LOCATION: LOCATION: ABDOMEN

## 2023-02-25 ASSESSMENT — PAIN DESCRIPTION - FREQUENCY: FREQUENCY: CONTINUOUS

## 2023-02-25 ASSESSMENT — PAIN - FUNCTIONAL ASSESSMENT: PAIN_FUNCTIONAL_ASSESSMENT: PREVENTS OR INTERFERES SOME ACTIVE ACTIVITIES AND ADLS

## 2023-02-25 ASSESSMENT — PAIN DESCRIPTION - ONSET: ONSET: OTHER (COMMENT)

## 2023-02-25 ASSESSMENT — PAIN DESCRIPTION - DESCRIPTORS: DESCRIPTORS: JABBING

## 2023-02-25 NOTE — CONSULTS
GENERAL SURGERY  CONSULT NOTE  2/25/2023    Physician Consulted: Dr. Garcia Home  Reason for Consult: possible obstruction    HPI  Juarez Pablo is a 45 y.o. female who presents for evaluation of abdominal pain and obstipation. History significant for diabetes, hypertension, homelessness, cocaine abuse. Patient denies any abdominal surgical history. No EGD or colonoscopy history. Patient presents with abdominal pain that began 1 day ago. States she originally noticed the pain after she was lightheaded and fell asleep in her car as she is homeless. During her interaction patient would frequently close her eyes and drift off momentarily, although she is alert and oriented when focused. Patient denies any alcohol use. Denies any blood in her stool. States her last bowel movement was 5 to 6 days ago, which is unusual for her as she typically has daily bowel movements. Labs remarkable for mild transaminitis and leukocytosis of 21K. Lactic acid 1.3. CT with evidence of nonspecific enteritis, mild dilation of bowel with fluid    Past Medical History:   Diagnosis Date    Asthma     Cellulitis     Diverticulitis     Diverticulosis     HTN (hypertension) 10/03/2011    Hyperlipidemia        Past Surgical History:   Procedure Laterality Date    LYMPH NODE DISSECTION  2010    back    TONSILLECTOMY      TOOTH EXTRACTION  2011       Medications Prior to Admission    Prior to Admission medications    Medication Sig Start Date End Date Taking?  Authorizing Provider   Levocetirizine Dihydrochloride (XYZAL PO) Take by mouth    Historical Provider, MD   atorvastatin (LIPITOR) 40 MG tablet Take 40 mg by mouth daily    Historical Provider, MD   venlafaxine (EFFEXOR) 75 MG tablet Take 150 mg by mouth daily     Historical Provider, MD   hydrochlorothiazide (MICROZIDE) 12.5 MG capsule Take 25 mg by mouth every morning     Historical Provider, MD       Allergies   Allergen Reactions    Amoxicillin Nausea And Vomiting Short of breath, vomiting. History reviewed. No pertinent family history. Social History     Tobacco Use    Smoking status: Former     Packs/day: 0.50     Years: 10.00     Pack years: 5.00     Types: Cigarettes     Quit date: 2014     Years since quittin.2    Smokeless tobacco: Never   Vaping Use    Vaping Use: Never used   Substance Use Topics    Alcohol use: No    Drug use: No       Review of Systems: Review of Systems - History obtained from chart review and the patient  General ROS: negative for - chills or fever  ENT ROS: negative for - sore throat  Allergy and Immunology ROS: negative for - hives  Hematological and Lymphatic ROS: negative for - blood transfusions, jaundice, or pallor  Respiratory ROS: positive for - cough  Cardiovascular ROS: positive for - chest pain and rapid heart rate  Gastrointestinal ROS: positive for - abdominal pain and constipation  negative for - blood in stools, heartburn, hematemesis, or melena  Genito-Urinary ROS: negative for - dysuria  Musculoskeletal ROS: negative for - muscular weakness  Dermatological ROS: negative for - rash      PHYSICAL EXAM:    Vitals:    23 0014   BP: 105/64   Pulse: 82   Resp: 22   Temp:    SpO2: 96%       GENERAL: Nontoxic-appearing, drowsy, uncomfortable  HEAD:  Normocephalic. Atraumatic. EYES:   No scleral icterus. PERRL. LUNGS:  No increased work of breathing on room air  CARDIOVASCULAR: RR  ABDOMEN:  Soft, mildly distended, mild to moderate tenderness, no rebound or rigidity  EXTREMITIES:   MAEx4. Atraumatic. No LE edema. SKIN: No scars on the abdomen except for stretch marks  NEUROLOGIC:  GCS 15  RECTAL: No hemorrhoids.  FOBT negative    ASSESSMENT/PLAN:  45 y.o. female with gastroenteritis and constipation    -Nonoperative management and supportive care  -Rocephin and Flagyl  -Monitor abdominal exam  -Continue IV fluids  -Repeat CMP/CBC in the a.m.  -Does not appear to be ischemic bowel related to recent cocaine use  -Stool studies  -diet as tolerated    Plan will be discussed with Dr. Hugo Paul.     Romie Broussard DO  Surgery Resident PGY-1  2/25/2023  12:44 AM

## 2023-02-25 NOTE — PROGRESS NOTES
Secured message sent via perfect serve to Dr. Aj Hernández. Patient is requesting crackers. No diet in for patient at this time. Patient continues to state that she is having stomach pains.

## 2023-02-25 NOTE — H&P
Hospitalist History & Physical      PCP: No primary care provider on file. Date of Service: Pt seen/examined on 2/25/2023     Chief Complaint:  had concerns including Abdominal Pain (Started this morning-generalized abd pain; states she hasn't had a BM in \"days\"; keeps falling asleep during triage, used crack yesterday). History Of Present Illness:    Ms. Екатерина García, a 45y.o. year old female  who  has a past medical history of Asthma, Cellulitis, Diverticulitis, Diverticulosis, HTN (hypertension), and Hyperlipidemia. Екатерина García is a 45 y.o. female who presents for evaluation of abdominal pain and obstipation. History significant for diabetes, hypertension, homelessness, cocaine abuse. Patient denies any abdominal surgical history. No EGD or colonoscopy history. Patient presents with abdominal pain that began 1 day ago. States she originally noticed the pain after she was lightheaded and fell asleep in her car as she is homeless. During her interaction patient would frequently close her eyes and drift off momentarily, although she is alert and oriented when focused. Patient denies any alcohol use. Denies any blood in her stool. States her last bowel movement was 5 to 6 days ago, which is unusual for her as she typically has daily bowel movements. Labs remarkable for mild transaminitis and leukocytosis of 21K. Lactic acid 1.3. CT with evidence of nonspecific enteritis, mild dilation of bowel with fluid      Past Medical History:   Diagnosis Date    Asthma     Cellulitis     Diverticulitis     Diverticulosis     HTN (hypertension) 10/03/2011    Hyperlipidemia        Past Surgical History:   Procedure Laterality Date    LYMPH NODE DISSECTION  2010    back    TONSILLECTOMY      TOOTH EXTRACTION  2011       Prior to Admission medications    Medication Sig Start Date End Date Taking?  Authorizing Provider   Levocetirizine Dihydrochloride (XYZAL PO) Take by mouth Historical Provider, MD   atorvastatin (LIPITOR) 40 MG tablet Take 40 mg by mouth daily    Historical Provider, MD   venlafaxine (EFFEXOR) 75 MG tablet Take 150 mg by mouth daily     Historical Provider, MD   hydrochlorothiazide (MICROZIDE) 12.5 MG capsule Take 25 mg by mouth every morning     Historical Provider, MD         Allergies:  Amoxicillin    Social History:    TOBACCO:   reports that she quit smoking about 8 years ago. Her smoking use included cigarettes. She has a 5.00 pack-year smoking history. She has never used smokeless tobacco.  ETOH:   reports no history of alcohol use. Family History:    Reviewed in detail and negative for DM, CAD, Cancer, CVA. Positive as follows\"  History reviewed. No pertinent family history. REVIEW OF SYSTEMS:   Pertinent positives as noted in the HPI. All other systems reviewed and negative. PHYSICAL EXAM:  /64   Pulse 82   Temp 98 °F (36.7 °C)   Resp 22   Ht 5' 4\" (1.626 m)   Wt 190 lb (86.2 kg)   SpO2 96%   BMI 32.61 kg/m²   General appearance: No apparent distress, appears stated age and cooperative. HEENT: Normal cephalic, atraumatic without obvious deformity. Pupils equal, round, and reactive to light. Extra ocular muscles intact. Conjunctivae/corneas clear. Neck: Supple, with full range of motion. No jugular venous distention. Trachea midline. Respiratory: CTA  Cardiovascular: RRR  Abdomen: Soft, with distended, tender to palpation  Musculoskeletal: No clubbing, cyanosis, edema of bilateral lower extremities. Brisk capillary refill. Skin: Normal skin color. No rashes or lesions. Neurologic:  Neurovascularly intact without any focal sensory/motor deficits.  Cranial nerves: II-XII intact, grossly non-focal.  Psychiatric: Alert and oriented, thought content appropriate, normal insight    Reviewed EKG and CXR personally      CBC:   Recent Labs     02/24/23  1827   WBC 21.3*   RBC 5.28   HGB 15.5   HCT 46.1   MCV 87.3   RDW 13.5        BMP:   Recent Labs     02/24/23  1827      K 3.2*   CL 97*   CO2 28   BUN 8   CREATININE 1.1*     LFT:  Recent Labs     02/24/23  1827   PROT 7.3   ALKPHOS 106*   ALT 49*   AST 66*   BILITOT 1.1   LIPASE 13     CE:  No results for input(s): CKTOTAL, TROPONINI in the last 72 hours.  PT/INR: No results for input(s): INR, APTT in the last 72 hours.  BNP: No results for input(s): BNP in the last 72 hours.  ESR:   Lab Results   Component Value Date    SEDRATE 68 (H) 10/11/2015     CRP:   Lab Results   Component Value Date    CRP 13.5 (H) 10/11/2015     D Dimer: No results found for: DDIMER   Folate and B12: No results found for: [id number], No results found for: FOLATE  Lactic Acid:   Lab Results   Component Value Date    LACTA 1.3 02/24/2023     Thyroid Studies:   Lab Results   Component Value Date    TSH 0.624 11/30/2012       Oupatient labs:  Lab Results   Component Value Date    TSH 0.624 11/30/2012    INR 1.0 05/13/2015       Urinalysis:    Lab Results   Component Value Date/Time    NITRU Negative 02/25/2023 12:17 AM    WBCUA 1-3 02/25/2023 12:17 AM    BACTERIA FEW 02/25/2023 12:17 AM    RBCUA 1-3 02/25/2023 12:17 AM    BLOODU SMALL 02/25/2023 12:17 AM    SPECGRAV <=1.005 02/25/2023 12:17 AM    GLUCOSEU Negative 02/25/2023 12:17 AM       Imaging:  US NON OB TRANSVAGINAL    Result Date: 2/24/2023  EXAMINATION: PELVIC ULTRASOUND 2/24/2023 TECHNIQUE: Ultrasound non OB transvaginal COMPARISON: None HISTORY: ORDERING SYSTEM PROVIDED HISTORY: ovarian mass TECHNOLOGIST PROVIDED HISTORY: Reason for exam:->ovarian mass What reading provider will be dictating this exam?->CRC FINDINGS: Measurements: Uterus: 8.7 cm x 4.7 cm x 4.6 cm Endometrial stripe: 9 mm Right Ovary:3.7 cm x 3.6 cm x 3.5 cm Left Ovary: 4.6 cm x 4.3 cm x 3.9 cm Ultrasound Findings: Uterus: Uterus demonstrates normal myometrial echotexture.  The uterus is anteverted. Endometrial stripe: Endometrial stripe is within normal limits. Right Ovary: Right ovary is  within normal limits. Normal arterial and venous flow. 2.8 cm simple cyst. Left Ovary:  Left ovary is within normal limits. Normal arterial and venous flow. Free Fluid: No evidence of free fluid. 2.8 cm right ovarian cyst.  The remainder of the exam is unremarkable. CT ABDOMEN PELVIS W IV CONTRAST Additional Contrast? None    Result Date: 2/24/2023  EXAMINATION: CT OF THE ABDOMEN AND PELVIS WITH CONTRAST 2/24/2023 8:14 pm TECHNIQUE: CT of the abdomen and pelvis was performed with the administration of intravenous contrast. Multiplanar reformatted images are provided for review. Automated exposure control, iterative reconstruction, and/or weight based adjustment of the mA/kV was utilized to reduce the radiation dose to as low as reasonably achievable. COMPARISON: Reviewed the CT chest/abdomen/pelvis of February 16, 2022, CT abdomen pelvis of April 17, 2017. HISTORY: ORDERING SYSTEM PROVIDED HISTORY: constipation, pain TECHNOLOGIST PROVIDED HISTORY: Additional Contrast?->None Reason for exam:->constipation, pain Decision Support Exception - unselect if not a suspected or confirmed emergency medical condition->Emergency Medical Condition (MA) What reading provider will be dictating this exam?->CRC FINDINGS: There are fat infiltration and replacement of the liver but difficult to be evaluated in a IV contrast only. The liver is enlarged, long axis for the right lobe of the liver is about 20 cm. There is normal single phase IV contrast enhancement for the liver (arterial/portal venous phase). No conspicuous focal lesions are seen. Gallbladder is normally distended. The biliary tree and pancreatic duct systems are not dilated. The pancreas has normal parenchymal enhancement. Some areas of fat replacement seen particular in the head of the pancreas. Spleen has upper borderline size measuring 12.5 by 5 cm appears otherwise unremarkable. Adrenals not enlarged.  Kidneys are preserved size, cortical thickness and the parenchymal enhancement. There are 2 small subcentimeter hypoenhancing lesion of few mm seen in the right kidney more likely simple cyst, but too small to be characterize. In the left kidney there is a 1.6 cm simple cyst in the mid 3rd. There is no renal calculus signs for obstructive uropathy. The ureters have unremarkable appearance. The bladder is normal distended, it appears unremarkable. The uterus measures 9.1 x 4.7 x 5.2 cm. Small focal hypoenhancing nodules are seen in myometrium represent myomatous changes. The right ovary has a cyst of 3 cm. There is a complex enlarged appearance for the left ovary which measures 7 x 4.6 x 3.7 cm. Some fluid appears to be in trapped between the left ovary and the uterus in the parametria area. There are minimal tethering of mesenteric root of the sigmoid colon towards the left ovary. Fluid contents are seen in the small bowel and in the right-sided colon. The appendix is identified and appears unremarkable. The some of the small bowel segments appears to be mild air/fluid distended measuring to 3.5 cm in the region of the jejunum. There are solid fecal contents in the transverse colon and in the sigmoid colon. There is a redundancy the sigmoid colon with upper word curvature. There is no pericolonic inflammatory process or signs for diverticulitis. There are no acute inflammatory changes in the omental/mesentery fat planes, free intraperitoneal air or ascites. Calcified atheromatous changes are seen in the abdominal aorta but there is no aneurysm formation or dissection. There is no midline retroperitoneal adenopathy. Lower lung bases appear unremarkable. Degenerative changes are seen in the thoracolumbar spine. 1.  Presence of mild the hepatomegaly with indication for fat infiltration of the liver. Borderline size spleen.  2.  Presence of fluid contents with mild dilatation of a more proximal small bowel segments at the level of the jejunum, presently nonspecific. Fluid contents are also seen in the right-sided colon. Cannot exclude a component of mild enteritis. 3.  Complex lesion in the left ovary. Final interpretation can require correlation with histopathology. Cannot exclude ovarian neoplasm. Further gynecological investigation is recommended. ASSESSMENT:  -Enteritis/gastroenteritis  -Leukocytosis  -Hypokalemia  -Constipation  -Hypertension  -Hyperlipidemia      PLAN:  -Admit to medicine  -General surgery following  -Rocephin and Flagyl  -Stool cultures  -Normal saline 75 mL/h  -Diet as tolerated  -Continue home medications        Diet: No diet orders on file  Code Status: No Order  Surrogate decision maker confirmed with patient:   Extended Emergency Contact Information  Primary Emergency Contact: [address]   Mobile Phone: [phone]  Relation: Parent  Preferred language: English    DVT Prophylaxis: []Lovenox []Heparin []PCD [] [address] []Encouraged ambulation  Disposition: []Med/Surg [] Intermediate [] ICU/CCU  Admit status: [] Observation [] Inpatient     +++++++++++++++++++++++++++++++++++++++++++++++++  Frederick Zepeda, DO  +++++++++++++++++++++++++++++++++++++++++++++++++  NOTE: This report was transcribed using voice recognition software. Every effort was made to ensure accuracy; however, inadvertent computerized transcription errors may be present.

## 2023-02-25 NOTE — ED NOTES
To ED via EMS from home c/o abd pain and no BM x 2 wks. She reported to EMS that she smoked crack cocaine last night. She states normal BM's are daily. Pt is tender to palp all throughout abdomen. At times during assessment, pt does fall asleep and needs to be tapped lightly to wake up. At times she does yell out.       Alton Don RN  02/24/23 1921

## 2023-02-26 LAB
ALBUMIN SERPL-MCNC: 2.9 G/DL (ref 3.5–5.2)
ALP BLD-CCNC: 111 U/L (ref 35–104)
ALT SERPL-CCNC: 43 U/L (ref 0–32)
ANION GAP SERPL CALCULATED.3IONS-SCNC: 11 MMOL/L (ref 7–16)
AST SERPL-CCNC: 51 U/L (ref 0–31)
BASOPHILS ABSOLUTE: 0.03 E9/L (ref 0–0.2)
BASOPHILS RELATIVE PERCENT: 0.4 % (ref 0–2)
BILIRUB SERPL-MCNC: 0.3 MG/DL (ref 0–1.2)
BUN BLDV-MCNC: 7 MG/DL (ref 6–20)
CALCIUM SERPL-MCNC: 8.3 MG/DL (ref 8.6–10.2)
CHLORIDE BLD-SCNC: 102 MMOL/L (ref 98–107)
CO2: 25 MMOL/L (ref 22–29)
CORTISOL TOTAL: 15.64 MCG/DL (ref 2.68–18.4)
CREAT SERPL-MCNC: 0.8 MG/DL (ref 0.5–1)
EOSINOPHILS ABSOLUTE: 0.23 E9/L (ref 0.05–0.5)
EOSINOPHILS RELATIVE PERCENT: 2.7 % (ref 0–6)
GFR SERPL CREATININE-BSD FRML MDRD: >60 ML/MIN/1.73
GLUCOSE BLD-MCNC: 106 MG/DL (ref 74–99)
HCT VFR BLD CALC: 34.9 % (ref 34–48)
HCT VFR BLD CALC: 35.9 % (ref 34–48)
HEMOGLOBIN: 11.4 G/DL (ref 11.5–15.5)
HEMOGLOBIN: 11.6 G/DL (ref 11.5–15.5)
IMMATURE GRANULOCYTES #: 0.04 E9/L
IMMATURE GRANULOCYTES %: 0.5 % (ref 0–5)
LACTIC ACID: 0.8 MMOL/L (ref 0.5–2.2)
LYMPHOCYTES ABSOLUTE: 1.52 E9/L (ref 1.5–4)
LYMPHOCYTES RELATIVE PERCENT: 17.8 % (ref 20–42)
MAGNESIUM: 1.9 MG/DL (ref 1.6–2.6)
MCH RBC QN AUTO: 27.8 PG (ref 26–35)
MCHC RBC AUTO-ENTMCNC: 32.3 % (ref 32–34.5)
MCV RBC AUTO: 85.9 FL (ref 80–99.9)
MONOCYTES ABSOLUTE: 0.59 E9/L (ref 0.1–0.95)
MONOCYTES RELATIVE PERCENT: 6.9 % (ref 2–12)
NEUTROPHILS ABSOLUTE: 6.12 E9/L (ref 1.8–7.3)
NEUTROPHILS RELATIVE PERCENT: 71.7 % (ref 43–80)
PDW BLD-RTO: 13.5 FL (ref 11.5–15)
PLATELET # BLD: 227 E9/L (ref 130–450)
PMV BLD AUTO: 10.3 FL (ref 7–12)
POTASSIUM REFLEX MAGNESIUM: 3 MMOL/L (ref 3.5–5)
RBC # BLD: 4.18 E12/L (ref 3.5–5.5)
SODIUM BLD-SCNC: 138 MMOL/L (ref 132–146)
TOTAL PROTEIN: 5.9 G/DL (ref 6.4–8.3)
TSH SERPL DL<=0.05 MIU/L-ACNC: 0.93 UIU/ML (ref 0.27–4.2)
WBC # BLD: 8.5 E9/L (ref 4.5–11.5)
WHITE BLOOD CELLS (WBC), STOOL: NORMAL

## 2023-02-26 PROCEDURE — 99232 SBSQ HOSP IP/OBS MODERATE 35: CPT | Performed by: SURGERY

## 2023-02-26 PROCEDURE — 2580000003 HC RX 258: Performed by: INTERNAL MEDICINE

## 2023-02-26 PROCEDURE — 1200000000 HC SEMI PRIVATE

## 2023-02-26 PROCEDURE — 6370000000 HC RX 637 (ALT 250 FOR IP): Performed by: STUDENT IN AN ORGANIZED HEALTH CARE EDUCATION/TRAINING PROGRAM

## 2023-02-26 PROCEDURE — 6360000002 HC RX W HCPCS: Performed by: STUDENT IN AN ORGANIZED HEALTH CARE EDUCATION/TRAINING PROGRAM

## 2023-02-26 PROCEDURE — 85025 COMPLETE CBC W/AUTO DIFF WBC: CPT

## 2023-02-26 PROCEDURE — 36415 COLL VENOUS BLD VENIPUNCTURE: CPT

## 2023-02-26 PROCEDURE — 2580000003 HC RX 258: Performed by: FAMILY MEDICINE

## 2023-02-26 PROCEDURE — 83735 ASSAY OF MAGNESIUM: CPT

## 2023-02-26 PROCEDURE — 2500000003 HC RX 250 WO HCPCS: Performed by: FAMILY MEDICINE

## 2023-02-26 PROCEDURE — 82533 TOTAL CORTISOL: CPT

## 2023-02-26 PROCEDURE — 80053 COMPREHEN METABOLIC PANEL: CPT

## 2023-02-26 PROCEDURE — 85018 HEMOGLOBIN: CPT

## 2023-02-26 PROCEDURE — 84443 ASSAY THYROID STIM HORMONE: CPT

## 2023-02-26 PROCEDURE — 83605 ASSAY OF LACTIC ACID: CPT

## 2023-02-26 PROCEDURE — 85014 HEMATOCRIT: CPT

## 2023-02-26 PROCEDURE — 6370000000 HC RX 637 (ALT 250 FOR IP): Performed by: FAMILY MEDICINE

## 2023-02-26 PROCEDURE — 6370000000 HC RX 637 (ALT 250 FOR IP): Performed by: INTERNAL MEDICINE

## 2023-02-26 PROCEDURE — 6360000002 HC RX W HCPCS: Performed by: FAMILY MEDICINE

## 2023-02-26 PROCEDURE — 6360000002 HC RX W HCPCS: Performed by: INTERNAL MEDICINE

## 2023-02-26 RX ORDER — SODIUM CHLORIDE, SODIUM LACTATE, POTASSIUM CHLORIDE, AND CALCIUM CHLORIDE .6; .31; .03; .02 G/100ML; G/100ML; G/100ML; G/100ML
250 INJECTION, SOLUTION INTRAVENOUS ONCE
Status: COMPLETED | OUTPATIENT
Start: 2023-02-26 | End: 2023-02-26

## 2023-02-26 RX ORDER — MORPHINE SULFATE 2 MG/ML
2 INJECTION, SOLUTION INTRAMUSCULAR; INTRAVENOUS
Status: DISCONTINUED | OUTPATIENT
Start: 2023-02-26 | End: 2023-02-26

## 2023-02-26 RX ORDER — POTASSIUM CHLORIDE 7.45 MG/ML
10 INJECTION INTRAVENOUS
Status: DISPENSED | OUTPATIENT
Start: 2023-02-26 | End: 2023-02-26

## 2023-02-26 RX ORDER — SODIUM CHLORIDE, SODIUM LACTATE, POTASSIUM CHLORIDE, CALCIUM CHLORIDE 600; 310; 30; 20 MG/100ML; MG/100ML; MG/100ML; MG/100ML
INJECTION, SOLUTION INTRAVENOUS CONTINUOUS
Status: DISCONTINUED | OUTPATIENT
Start: 2023-02-26 | End: 2023-02-27 | Stop reason: HOSPADM

## 2023-02-26 RX ORDER — TRAMADOL HYDROCHLORIDE 50 MG/1
50 TABLET ORAL EVERY 6 HOURS PRN
Status: DISCONTINUED | OUTPATIENT
Start: 2023-02-26 | End: 2023-02-27

## 2023-02-26 RX ORDER — POTASSIUM CHLORIDE 7.45 MG/ML
10 INJECTION INTRAVENOUS
Status: COMPLETED | OUTPATIENT
Start: 2023-02-26 | End: 2023-02-26

## 2023-02-26 RX ADMIN — SODIUM CHLORIDE, POTASSIUM CHLORIDE, SODIUM LACTATE AND CALCIUM CHLORIDE: 600; 310; 30; 20 INJECTION, SOLUTION INTRAVENOUS at 15:52

## 2023-02-26 RX ADMIN — POTASSIUM CHLORIDE 10 MEQ: 7.46 INJECTION, SOLUTION INTRAVENOUS at 09:08

## 2023-02-26 RX ADMIN — METRONIDAZOLE 500 MG: 500 INJECTION, SOLUTION INTRAVENOUS at 02:39

## 2023-02-26 RX ADMIN — OXYCODONE HYDROCHLORIDE 10 MG: 10 TABLET ORAL at 09:03

## 2023-02-26 RX ADMIN — POTASSIUM CHLORIDE 10 MEQ: 7.46 INJECTION, SOLUTION INTRAVENOUS at 13:18

## 2023-02-26 RX ADMIN — METRONIDAZOLE 500 MG: 500 INJECTION, SOLUTION INTRAVENOUS at 17:44

## 2023-02-26 RX ADMIN — TRIMETHOBENZAMIDE HYDROCHLORIDE 200 MG: 100 INJECTION INTRAMUSCULAR at 13:08

## 2023-02-26 RX ADMIN — SODIUM CHLORIDE, POTASSIUM CHLORIDE, SODIUM LACTATE AND CALCIUM CHLORIDE 250 ML: 600; 310; 30; 20 INJECTION, SOLUTION INTRAVENOUS at 12:26

## 2023-02-26 RX ADMIN — ACETAMINOPHEN 650 MG: 325 TABLET ORAL at 02:37

## 2023-02-26 RX ADMIN — TRAMADOL HYDROCHLORIDE 50 MG: 50 TABLET, COATED ORAL at 23:54

## 2023-02-26 RX ADMIN — CEFTRIAXONE 2000 MG: 2 INJECTION, POWDER, FOR SOLUTION INTRAMUSCULAR; INTRAVENOUS at 02:38

## 2023-02-26 RX ADMIN — POTASSIUM CHLORIDE 10 MEQ: 7.46 INJECTION, SOLUTION INTRAVENOUS at 10:49

## 2023-02-26 RX ADMIN — ATORVASTATIN CALCIUM 40 MG: 40 TABLET, FILM COATED ORAL at 09:03

## 2023-02-26 RX ADMIN — ENOXAPARIN SODIUM 40 MG: 100 INJECTION SUBCUTANEOUS at 09:04

## 2023-02-26 RX ADMIN — VENLAFAXINE HYDROCHLORIDE 150 MG: 150 CAPSULE, EXTENDED RELEASE ORAL at 09:03

## 2023-02-26 RX ADMIN — POTASSIUM BICARBONATE 40 MEQ: 782 TABLET, EFFERVESCENT ORAL at 09:03

## 2023-02-26 RX ADMIN — POTASSIUM CHLORIDE 10 MEQ: 7.46 INJECTION, SOLUTION INTRAVENOUS at 15:12

## 2023-02-26 RX ADMIN — TRAMADOL HYDROCHLORIDE 50 MG: 50 TABLET, COATED ORAL at 17:51

## 2023-02-26 RX ADMIN — METRONIDAZOLE 500 MG: 500 INJECTION, SOLUTION INTRAVENOUS at 11:14

## 2023-02-26 RX ADMIN — OXYCODONE HYDROCHLORIDE 10 MG: 10 TABLET ORAL at 02:37

## 2023-02-26 ASSESSMENT — PAIN DESCRIPTION - ORIENTATION
ORIENTATION: UPPER;RIGHT;LEFT
ORIENTATION: LEFT;RIGHT
ORIENTATION: RIGHT;LEFT

## 2023-02-26 ASSESSMENT — PAIN - FUNCTIONAL ASSESSMENT
PAIN_FUNCTIONAL_ASSESSMENT: ACTIVITIES ARE NOT PREVENTED
PAIN_FUNCTIONAL_ASSESSMENT: PREVENTS OR INTERFERES SOME ACTIVE ACTIVITIES AND ADLS
PAIN_FUNCTIONAL_ASSESSMENT: ACTIVITIES ARE NOT PREVENTED
PAIN_FUNCTIONAL_ASSESSMENT: PREVENTS OR INTERFERES SOME ACTIVE ACTIVITIES AND ADLS

## 2023-02-26 ASSESSMENT — PAIN DESCRIPTION - LOCATION
LOCATION: ABDOMEN

## 2023-02-26 ASSESSMENT — PAIN DESCRIPTION - DESCRIPTORS
DESCRIPTORS: ACHING;DISCOMFORT;SORE;SHARP
DESCRIPTORS: ACHING;SHARP
DESCRIPTORS: ACHING;SHARP;STABBING

## 2023-02-26 ASSESSMENT — PAIN DESCRIPTION - ONSET
ONSET: ON-GOING

## 2023-02-26 ASSESSMENT — PAIN DESCRIPTION - PAIN TYPE
TYPE: ACUTE PAIN

## 2023-02-26 ASSESSMENT — PAIN SCALES - GENERAL
PAINLEVEL_OUTOF10: 4
PAINLEVEL_OUTOF10: 9
PAINLEVEL_OUTOF10: 4
PAINLEVEL_OUTOF10: 9
PAINLEVEL_OUTOF10: 8
PAINLEVEL_OUTOF10: 0
PAINLEVEL_OUTOF10: 7
PAINLEVEL_OUTOF10: 7
PAINLEVEL_OUTOF10: 6

## 2023-02-26 ASSESSMENT — PAIN DESCRIPTION - FREQUENCY
FREQUENCY: CONTINUOUS

## 2023-02-26 NOTE — PROGRESS NOTES
Pt complaining of feeling dizzy and like is going to faint even when sitting in the bed.  Dr Pete Richards notified

## 2023-02-26 NOTE — PROGRESS NOTES
[facility]  PROGRESS NOTE  ATTENDING NOTE    Chief Complaint   Patient presents with    Abdominal Pain     Started this morning-generalized abd pain; states she hasn't had a BM in \"days\"; keeps falling asleep during triage, used crack yesterday       S: 57-year-old female presented with abdominal pain initially constipation now diarrhea. Even though she was n.p.o. yesterday her family brought her in [place]. She ate all of the [place] without issue. She still complains of abdominal pain. She also complains of syncope now and sitting up in a chair and now when lying in bed. She states that the pain medication were giving her not touching her. She continues to pass out while talking to her.     BP (!) 112/59   Pulse 58   Temp 97.3 °F (36.3 °C) (Temporal)   Resp 20   Ht 5' 4\" (1.626 m)   Wt 190 lb (86.2 kg)   SpO2 95%   BMI 32.61 kg/m²   Gen:  NAD  Abd:  soft, ND, mild diffuse abdominal pain    ASSESSMENT/PLAN:  Abdominal pain and diarrhea not otherwise specified  --Follow-up stool studies  --Lactic acid and white count are normal.  There is no reason that she would need any surgical intervention at this time  --Continue IV antibiotics    Please call surgery if needed we will sign off    Michael Upton MD, MSc, FACS  2/26/2023  2:55 PM

## 2023-02-26 NOTE — PROGRESS NOTES
Hospitalist Progress Note      Synopsis: Patient admitted on 2/24/2023  45y.o. year old female  who  has a past medical history of Asthma, Cellulitis, Diverticulitis, Diverticulosis, HTN (hypertension), and Hyperlipidemia. Brendon Rodriguez is a 45 y.o. female who presents for evaluation of abdominal pain and obstipation. History significant for diabetes, hypertension, homelessness, cocaine abuse. Patient denies any abdominal surgical history. No EGD or colonoscopy history. Patient presents with abdominal pain that began 1 day ago. States she originally noticed the pain after she was lightheaded and fell asleep in her car as she is homeless. During her interaction patient would frequently close her eyes and drift off momentarily, although she is alert and oriented when focused. Patient denies any alcohol use. Denies any blood in her stool. States her last bowel movement was 5 to 6 days ago, which is unusual for her as she typically has daily bowel movements. Labs remarkable for mild transaminitis and leukocytosis of 21K. Lactic acid 1.3. CT with evidence of nonspecific enteritis, mild dilation of bowel with fluid    Subjective    Feeling better patient very sleepy during examination  No CP or SOB  No fever or chills   No uncontrolled pain  No vomiting or diarrhea   No events reported overnight     Exam:  BP (!) 101/57   Pulse 53   Temp 97.5 °F (36.4 °C) (Temporal)   Resp 19   Ht 5' 4\" (1.626 m)   Wt 190 lb (86.2 kg)   SpO2 96%   BMI 32.61 kg/m²   General appearance: Somnolence appears to keep nodding off during exam no apparent distress, appears stated age and cooperative. HEENT: Pupils equal, round, and reactive to light. Conjunctivae/corneas clear. Neck: Supple. No jugular venous distention. Trachea midline. Respiratory:  Normal respiratory effort. Clear to auscultation, bilaterally without Rales/Wheezes/Rhonchi.   Cardiovascular: Regular rate and rhythm with normal S1/S2 without murmurs, rubs or gallops. Abdomen: Soft, mild, distractible tender, non-distended with normal bowel sounds. Musculoskeletal: No clubbing, cyanosis or edema bilaterally. Brisk capillary refill. 2+radial pulses. Skin:  No rashes    Neurologic: Somnolence and not alert and following commands    Medications:  Reviewed    Infusion Medications    sodium chloride      sodium chloride 950 mL (02/25/23 1546)     Scheduled Medications    potassium chloride  10 mEq IntraVENous Q1H    cefTRIAXone (ROCEPHIN) IV  2,000 mg IntraVENous Q24H    metroNIDAZOLE  500 mg IntraVENous Q8H    atorvastatin  40 mg Oral Daily    [Held by provider] hydroCHLOROthiazide  25 mg Oral QAM    venlafaxine  150 mg Oral Daily    sodium chloride flush  10 mL IntraVENous 2 times per day    enoxaparin  40 mg SubCUTAneous Daily     PRN Meds: oxyCODONE **OR** oxyCODONE, sodium chloride flush, sodium chloride, polyethylene glycol, acetaminophen **OR** acetaminophen, trimethobenzamide, morphine    I/O  No intake or output data in the 24 hours ending 02/26/23 1121    Labs:   Recent Labs     02/24/23 1827 02/25/23 0535 02/26/23  0446   WBC 21.3* 15.9* 8.5   HGB 15.5 13.8 11.6   HCT 46.1 41.3 35.9    257 227       Recent Labs     02/24/23 1827 02/25/23  0535 02/26/23  0446    138 138   K 3.2* 3.3* 3.0*   CL 97* 102 102   CO2 28 24 25   BUN 8 9 7   CREATININE 1.1* 1.0 0.8   CALCIUM 9.4 8.6 8.3*       Recent Labs     02/24/23 1827 02/25/23  0535 02/26/23  0446   PROT 7.3 6.8 5.9*   ALKPHOS 106* 86 111*   ALT 49* 43* 43*   AST 66* 41* 51*   BILITOT 1.1 0.8 0.3   LIPASE 13  --   --        No results for input(s): INR in the last 72 hours. No results for input(s): Sonal Jordan in the last 72 hours. Chronic labs:  Lab Results   Component Value Date    TSH 0.624 11/30/2012    INR 1.0 05/13/2015       Radiology:  Imaging studies reviewed today.     ASSESSMENT:    Principal Problem:    Enteritis  Active Problems:    Generalized abdominal pain    Fecal retention    Cocaine abuse (HCC)  Resolved Problems:    * No resolved hospital problems. *       PLAN:    Admitted to internal medicine for abdominal pain  Concern for constipation and enteritis of uncertain etiology  General surgery consult no intervention, conservative measures recommended  I am concerned the patient is multiple doses of oxycodone too many pain medications.  She appears overmedicated on my exam.  She has had a hypotension as well as hypoxia which may be related to pain medications.  Stop oxycodone and morphine.  Mild distractible abdominal tenderness to my exam.  Continue Tylenol add tramadol.  Stool studies-few fecal leuks, negative stool culture, negative rotavirus  Leukocytosis resolved  IV Rocephin and Flagyl  IV fluids  Potassium replacement  Monitor electrolytes  Counseled on cessation of cocaine abuse with recommendations for peers support group meeting attendance such as CA, NA, AA if abstinence proves difficult  Symptomatic hypotensionafter receiving multiple doses of oxycodone IV fluid bolus, stat lactate and hemoglobin-reviewed and stable  Diet: ADULT DIET; Regular  Code Status: Full Code  PT/OT Eval Status:     DVT Prophylaxis:     Recommended disposition at discharge:      +++++++++++++++++++++++++++++++++++++++++++++++++  Amol Davis MD   [facility].  +++++++++++++++++++++++++++++++++++++++++++++++++  NOTE: This report was transcribed using voice recognition software. Every effort was made to ensure accuracy; however, inadvertent computerized transcription errors may be present.

## 2023-02-27 VITALS
HEIGHT: 64 IN | TEMPERATURE: 97 F | WEIGHT: 190 LBS | RESPIRATION RATE: 18 BRPM | SYSTOLIC BLOOD PRESSURE: 140 MMHG | OXYGEN SATURATION: 96 % | HEART RATE: 66 BPM | DIASTOLIC BLOOD PRESSURE: 79 MMHG | BODY MASS INDEX: 32.44 KG/M2

## 2023-02-27 PROBLEM — F11.11 HX OF OPIOID ABUSE (HCC): Status: ACTIVE | Noted: 2023-02-27

## 2023-02-27 LAB
ALBUMIN SERPL-MCNC: 3.3 G/DL (ref 3.5–5.2)
ALP BLD-CCNC: 112 U/L (ref 35–104)
ALT SERPL-CCNC: 38 U/L (ref 0–32)
ANION GAP SERPL CALCULATED.3IONS-SCNC: 12 MMOL/L (ref 7–16)
AST SERPL-CCNC: 30 U/L (ref 0–31)
BASOPHILS ABSOLUTE: 0.01 E9/L (ref 0–0.2)
BASOPHILS RELATIVE PERCENT: 0.2 % (ref 0–2)
BILIRUB SERPL-MCNC: 0.3 MG/DL (ref 0–1.2)
BUN BLDV-MCNC: 7 MG/DL (ref 6–20)
CALCIUM SERPL-MCNC: 8.7 MG/DL (ref 8.6–10.2)
CHLORIDE BLD-SCNC: 101 MMOL/L (ref 98–107)
CO2: 26 MMOL/L (ref 22–29)
CREAT SERPL-MCNC: 0.7 MG/DL (ref 0.5–1)
CRYPTOSPORIDIUM ANTIGEN STOOL: NORMAL
CULTURE, STOOL: NORMAL
EKG ATRIAL RATE: 91 BPM
EKG P AXIS: 64 DEGREES
EKG P-R INTERVAL: 180 MS
EKG Q-T INTERVAL: 460 MS
EKG QRS DURATION: 86 MS
EKG QTC CALCULATION (BAZETT): 565 MS
EKG R AXIS: 60 DEGREES
EKG T AXIS: 69 DEGREES
EKG VENTRICULAR RATE: 91 BPM
EOSINOPHILS ABSOLUTE: 0.23 E9/L (ref 0.05–0.5)
EOSINOPHILS RELATIVE PERCENT: 4.4 % (ref 0–6)
GFR SERPL CREATININE-BSD FRML MDRD: >60 ML/MIN/1.73
GIARDIA ANTIGEN STOOL: NORMAL
GLUCOSE BLD-MCNC: 110 MG/DL (ref 74–99)
HCT VFR BLD CALC: 34.3 % (ref 34–48)
HEMOGLOBIN: 11.5 G/DL (ref 11.5–15.5)
IMMATURE GRANULOCYTES #: 0.02 E9/L
IMMATURE GRANULOCYTES %: 0.4 % (ref 0–5)
LYMPHOCYTES ABSOLUTE: 0.83 E9/L (ref 1.5–4)
LYMPHOCYTES RELATIVE PERCENT: 15.7 % (ref 20–42)
MAGNESIUM: 1.9 MG/DL (ref 1.6–2.6)
MCH RBC QN AUTO: 28.1 PG (ref 26–35)
MCHC RBC AUTO-ENTMCNC: 33.5 % (ref 32–34.5)
MCV RBC AUTO: 83.9 FL (ref 80–99.9)
MONOCYTES ABSOLUTE: 0.39 E9/L (ref 0.1–0.95)
MONOCYTES RELATIVE PERCENT: 7.4 % (ref 2–12)
NEUTROPHILS ABSOLUTE: 3.8 E9/L (ref 1.8–7.3)
NEUTROPHILS RELATIVE PERCENT: 71.9 % (ref 43–80)
PDW BLD-RTO: 13.3 FL (ref 11.5–15)
PLATELET # BLD: 206 E9/L (ref 130–450)
PMV BLD AUTO: 10.3 FL (ref 7–12)
POTASSIUM REFLEX MAGNESIUM: 3.4 MMOL/L (ref 3.5–5)
RBC # BLD: 4.09 E12/L (ref 3.5–5.5)
SODIUM BLD-SCNC: 139 MMOL/L (ref 132–146)
TOTAL PROTEIN: 6.3 G/DL (ref 6.4–8.3)
WBC # BLD: 5.3 E9/L (ref 4.5–11.5)

## 2023-02-27 PROCEDURE — 80053 COMPREHEN METABOLIC PANEL: CPT

## 2023-02-27 PROCEDURE — 6360000002 HC RX W HCPCS: Performed by: FAMILY MEDICINE

## 2023-02-27 PROCEDURE — 2580000003 HC RX 258: Performed by: FAMILY MEDICINE

## 2023-02-27 PROCEDURE — 6370000000 HC RX 637 (ALT 250 FOR IP): Performed by: INTERNAL MEDICINE

## 2023-02-27 PROCEDURE — 93010 ELECTROCARDIOGRAM REPORT: CPT | Performed by: INTERNAL MEDICINE

## 2023-02-27 PROCEDURE — 36415 COLL VENOUS BLD VENIPUNCTURE: CPT

## 2023-02-27 PROCEDURE — 85025 COMPLETE CBC W/AUTO DIFF WBC: CPT

## 2023-02-27 PROCEDURE — 6370000000 HC RX 637 (ALT 250 FOR IP): Performed by: FAMILY MEDICINE

## 2023-02-27 PROCEDURE — 2500000003 HC RX 250 WO HCPCS: Performed by: FAMILY MEDICINE

## 2023-02-27 PROCEDURE — 83735 ASSAY OF MAGNESIUM: CPT

## 2023-02-27 RX ORDER — CEFDINIR 300 MG/1
300 CAPSULE ORAL EVERY 12 HOURS SCHEDULED
Status: DISCONTINUED | OUTPATIENT
Start: 2023-02-27 | End: 2023-02-27 | Stop reason: HOSPADM

## 2023-02-27 RX ORDER — ACETAMINOPHEN 325 MG/1
650 TABLET ORAL EVERY 4 HOURS PRN
Status: DISCONTINUED | OUTPATIENT
Start: 2023-02-27 | End: 2023-02-27 | Stop reason: HOSPADM

## 2023-02-27 RX ORDER — CEFDINIR 300 MG/1
300 CAPSULE ORAL EVERY 12 HOURS SCHEDULED
Qty: 5 CAPSULE | Refills: 0 | Status: SHIPPED | OUTPATIENT
Start: 2023-02-27 | End: 2023-03-02

## 2023-02-27 RX ORDER — ACETAMINOPHEN 650 MG/1
650 SUPPOSITORY RECTAL EVERY 6 HOURS PRN
Status: DISCONTINUED | OUTPATIENT
Start: 2023-02-27 | End: 2023-02-27 | Stop reason: HOSPADM

## 2023-02-27 RX ORDER — METRONIDAZOLE 500 MG/1
500 TABLET ORAL EVERY 8 HOURS SCHEDULED
Qty: 8 TABLET | Refills: 0 | Status: SHIPPED | OUTPATIENT
Start: 2023-02-27 | End: 2023-03-02

## 2023-02-27 RX ORDER — TRAMADOL HYDROCHLORIDE 50 MG/1
50 TABLET ORAL EVERY 6 HOURS PRN
Status: DISCONTINUED | OUTPATIENT
Start: 2023-02-27 | End: 2023-02-27 | Stop reason: HOSPADM

## 2023-02-27 RX ORDER — METRONIDAZOLE 500 MG/1
500 TABLET ORAL EVERY 8 HOURS SCHEDULED
Status: DISCONTINUED | OUTPATIENT
Start: 2023-02-27 | End: 2023-02-27 | Stop reason: HOSPADM

## 2023-02-27 RX ADMIN — ENOXAPARIN SODIUM 40 MG: 100 INJECTION SUBCUTANEOUS at 08:52

## 2023-02-27 RX ADMIN — ATORVASTATIN CALCIUM 40 MG: 40 TABLET, FILM COATED ORAL at 08:52

## 2023-02-27 RX ADMIN — CEFDINIR 300 MG: 300 CAPSULE ORAL at 12:29

## 2023-02-27 RX ADMIN — METRONIDAZOLE 500 MG: 500 INJECTION, SOLUTION INTRAVENOUS at 02:14

## 2023-02-27 RX ADMIN — VENLAFAXINE HYDROCHLORIDE 150 MG: 150 CAPSULE, EXTENDED RELEASE ORAL at 08:52

## 2023-02-27 RX ADMIN — SODIUM CHLORIDE, PRESERVATIVE FREE 10 ML: 5 INJECTION INTRAVENOUS at 02:14

## 2023-02-27 RX ADMIN — CEFTRIAXONE 2000 MG: 2 INJECTION, POWDER, FOR SOLUTION INTRAMUSCULAR; INTRAVENOUS at 02:14

## 2023-02-27 RX ADMIN — POLYETHYLENE GLYCOL 3350 17 G: 17 POWDER, FOR SOLUTION ORAL at 02:22

## 2023-02-27 RX ADMIN — TRAMADOL HYDROCHLORIDE 50 MG: 50 TABLET, COATED ORAL at 06:20

## 2023-02-27 RX ADMIN — METRONIDAZOLE 500 MG: 500 TABLET ORAL at 11:40

## 2023-02-27 RX ADMIN — POTASSIUM BICARBONATE 40 MEQ: 782 TABLET, EFFERVESCENT ORAL at 11:40

## 2023-02-27 ASSESSMENT — PAIN DESCRIPTION - FREQUENCY: FREQUENCY: CONTINUOUS

## 2023-02-27 ASSESSMENT — PAIN DESCRIPTION - LOCATION: LOCATION: ABDOMEN

## 2023-02-27 ASSESSMENT — PAIN - FUNCTIONAL ASSESSMENT: PAIN_FUNCTIONAL_ASSESSMENT: ACTIVITIES ARE NOT PREVENTED

## 2023-02-27 ASSESSMENT — PAIN DESCRIPTION - DESCRIPTORS: DESCRIPTORS: ACHING;GNAWING;NAGGING

## 2023-02-27 ASSESSMENT — PAIN DESCRIPTION - PAIN TYPE: TYPE: ACUTE PAIN

## 2023-02-27 ASSESSMENT — PAIN SCALES - GENERAL: PAINLEVEL_OUTOF10: 7

## 2023-02-27 ASSESSMENT — PAIN DESCRIPTION - ONSET: ONSET: ON-GOING

## 2023-02-27 NOTE — PLAN OF CARE
Problem: Discharge Planning  Goal: Discharge to home or other facility with appropriate resources  2/27/2023 0354 by Frederick Salazar RN  Outcome: Not Progressing  2/26/2023 1356 by Luci Dowell RN  Outcome: Progressing     Problem: Pain  Goal: Verbalizes/displays adequate comfort level or baseline comfort level  2/27/2023 0354 by Frederick Salazar RN  Outcome: Not Progressing  2/26/2023 1356 by Luci Dowell RN  Outcome: Progressing     Problem: Chronic Conditions and Co-morbidities  Goal: Patient's chronic conditions and co-morbidity symptoms are monitored and maintained or improved  Outcome: Not Progressing     Problem: Gastrointestinal - Adult  Goal: Minimal or absence of nausea and vomiting  Outcome: Progressing  Goal: Maintains or returns to baseline bowel function  Outcome: Not Progressing  Goal: Maintains adequate nutritional intake  Outcome: Progressing     Problem: Infection - Adult  Goal: Absence of infection at discharge  Outcome: Progressing     Problem: Behavior  Goal: Pt/Family maintain appropriate behavior and adhere to behavioral management agreement, if implemented  Description: INTERVENTIONS:  1. Assess patient/family's coping skills and  non-compliant behavior (including use of illegal substances)  2. Notify security of behavior or suspected illegal substances which indicate the need for search of the family and/or belongings  3. Encourage verbalization of thoughts and concerns in a socially appropriate manner  4. Utilize positive, consistent limit setting strategies supporting safety of patient, staff and others  5. Encourage participation in the decision making process about the behavioral management agreement  6. If a visitor's behavior poses a threat to safety call refer to organization policy.   7. Initiate consult with , Psychosocial CNS, Spiritual Care as appropriate  Outcome: Not Progressing     Problem: Drug Abuse/Detox  Goal: Will have no detox symptoms and will verbalize plan for changing drug-related behavior  Description: INTERVENTIONS:  1. Administer medication as ordered  2. Monitor physical status  3. Provide emotional support with 1:1 interaction with staff  4.  Encourage  recovery focused treatment   Outcome: Not Progressing

## 2023-02-27 NOTE — PROGRESS NOTES
Patient was found yet again connecting/disconnecting her IV at her leisure and manipulating the controls on the pump. Instructed patient to refrain from that behavior.

## 2023-02-27 NOTE — DISCHARGE SUMMARY
Physician Discharge Summary     Patient ID:  Jesse Luo  [id number]  45 y.o.  1970    Admit date: 2/24/2023    Discharge date and time:  2/27/2023    Discharge Diagnoses: Principal Problem:    Enteritis  Active Problems:    Generalized abdominal pain    Fecal retention    Cocaine abuse ([facility] [address]    Hx of opioid abuse ([facility] [address]  Resolved Problems:    * No resolved hospital problems. *      Consults: IP CONSULT TO GENERAL SURGERY  IP CONSULT TO INTERNAL MEDICINE    Procedures: See below    Hospital Course: 45 y.o. female who presents for evaluation of abdominal pain and obstipation. History significant for diabetes, hypertension, homelessness, cocaine abuse. Patient denies any abdominal surgical history. No EGD or colonoscopy history. Patient presents with abdominal pain that began 1 day ago. States she originally noticed the pain after she was lightheaded and fell asleep in her car as she is homeless. During her interaction patient would frequently close her eyes and drift off momentarily, although she is alert and oriented when focused. Patient denies any alcohol use. Denies any blood in her stool. States her last bowel movement was 5 to 6 days ago, which is unusual for her as she typically has daily bowel movements. Labs remarkable for mild transaminitis and leukocytosis of 21K. Lactic acid 1.3. CT with evidence of nonspecific enteritis, mild dilation of bowel with fluid    Admitted to internal medicine for abdominal pain  Concern for constipation and enteritis of uncertain etiology  General surgery consult no intervention, conservative measures recommended  I am concerned the patient is overly medicated   She has had  hypotension as well as hypoxia which may be related to pain medications. Stop oxycodone and morphine. Mild distractible abdominal tenderness to my exam.  Continue Tylenol. Her hx of opiate abuse previously on suboxone is a relative contraindication to opiate pain medications. Stool studies-few fecal leuks, negative stool culture, negative rotavirus  Leukocytosis resolved  IV Rocephin and Flagyl-- PO Rx  IV fluids  Potassium replacement  Monitor electrolytes  Counseled on cessation of cocaine abuse with recommendations for peers support group meeting attendance such as CA, NA, AA if abstinence proves difficult  Symptomatic hypotensionafter receiving multiple doses of oxycodone IV fluid bolus, stat lactate and hemoglobin-reviewed and stable    Discharge Exam:  See progress note from today    Condition:  Stable    Disposition: home    Patient Instructions:   Current Discharge Medication List        START taking these medications    Details   cefdinir (OMNICEF) 300 MG capsule Take 1 capsule by mouth every 12 hours for 5 doses  Qty: 5 capsule, Refills: 0      metroNIDAZOLE (FLAGYL) 500 MG tablet Take 1 tablet by mouth every 8 hours for 8 doses  Qty: 8 tablet, Refills: 0           CONTINUE these medications which have NOT CHANGED    Details   gabapentin (NEURONTIN) 400 MG capsule Take 800 mg by mouth 3 times daily.       meloxicam (MOBIC) 15 MG tablet Take 15 mg by mouth as needed for Pain      Levocetirizine Dihydrochloride (XYZAL PO) Take by mouth as needed      atorvastatin (LIPITOR) 40 MG tablet Take 40 mg by mouth at bedtime      venlafaxine (EFFEXOR) 75 MG tablet Take 150 mg by mouth daily            STOP taking these medications       hydrochlorothiazide (MICROZIDE) 12.5 MG capsule Comments:   Reason for Stopping:             Activity: activity as tolerated  Diet: regular diet    Follow-up with 1wk PCP      Signed:  Av Beatty MD  2/27/2023  1:16 PM

## 2023-02-27 NOTE — PROGRESS NOTES
Hospitalist Progress Note      Synopsis: Patient admitted on 2/24/2023  45y.o. year old female  who  has a past medical history of Asthma, Cellulitis, Diverticulitis, Diverticulosis, HTN (hypertension), and Hyperlipidemia. Pau Owens is a 45 y.o. female who presents for evaluation of abdominal pain and obstipation. History significant for diabetes, hypertension, homelessness, cocaine abuse. Patient denies any abdominal surgical history. No EGD or colonoscopy history. Patient presents with abdominal pain that began 1 day ago. States she originally noticed the pain after she was lightheaded and fell asleep in her car as she is homeless. During her interaction patient would frequently close her eyes and drift off momentarily, although she is alert and oriented when focused. Patient denies any alcohol use. Denies any blood in her stool. States her last bowel movement was 5 to 6 days ago, which is unusual for her as she typically has daily bowel movements. Labs remarkable for mild transaminitis and leukocytosis of 21K. Lactic acid 1.3. CT with evidence of nonspecific enteritis, mild dilation of bowel with fluid    Subjective    Feeling better patient very sleepy during examination  No CP or SOB  No fever or chills   No uncontrolled pain  No vomiting or diarrhea   No events reported overnight     Exam:  BP (!) 140/79   Pulse 66   Temp 97 °F (36.1 °C) (Temporal)   Resp 18   Ht 5' 4\" (1.626 m)   Wt 190 lb (86.2 kg)   SpO2 96%   BMI 32.61 kg/m²   General appearance: Somnolence appears to keep nodding off during exam no apparent distress, appears stated age and cooperative. HEENT: Pupils equal, round, and reactive to light. Conjunctivae/corneas clear. Neck: Supple. No jugular venous distention. Trachea midline. Respiratory:  Normal respiratory effort. Clear to auscultation, bilaterally without Rales/Wheezes/Rhonchi.   Cardiovascular: Regular rate and rhythm with normal S1/S2 without murmurs, rubs or gallops. Abdomen: Soft, mild, distractible tender, non-distended with normal bowel sounds. Musculoskeletal: No clubbing, cyanosis or edema bilaterally. Brisk capillary refill. 2+radial pulses. Skin:  No rashes    Neurologic: Somnolence and not alert and following commands    Medications:  Reviewed    Infusion Medications    [Held by provider] lactated ringers IV soln Stopped (02/26/23 1744)    sodium chloride       Scheduled Medications    cefTRIAXone (ROCEPHIN) IV  2,000 mg IntraVENous Q24H    metroNIDAZOLE  500 mg IntraVENous Q8H    atorvastatin  40 mg Oral Daily    [Held by provider] hydroCHLOROthiazide  25 mg Oral QAM    venlafaxine  150 mg Oral Daily    sodium chloride flush  10 mL IntraVENous 2 times per day    enoxaparin  40 mg SubCUTAneous Daily     PRN Meds: traMADol, sodium chloride flush, sodium chloride, polyethylene glycol, acetaminophen **OR** acetaminophen, trimethobenzamide    I/O    Intake/Output Summary (Last 24 hours) at 2/27/2023 1054  Last data filed at 2/27/2023 0214  Gross per 24 hour   Intake 3210.43 ml   Output --   Net 3210.43 ml       Labs:   Recent Labs     02/25/23  0535 02/26/23  0446 02/26/23  1137 02/27/23  0524   WBC 15.9* 8.5  --  5.3   HGB 13.8 11.6 11.4* 11.5   HCT 41.3 35.9 34.9 34.3    227  --  206         Recent Labs     02/25/23  0535 02/26/23  0446 02/27/23  0524    138 139   K 3.3* 3.0* 3.4*    102 101   CO2 24 25 26   BUN 9 7 7   CREATININE 1.0 0.8 0.7   CALCIUM 8.6 8.3* 8.7         Recent Labs     02/24/23  1827 02/25/23  0535 02/26/23  0446 02/27/23  0524   PROT 7.3 6.8 5.9* 6.3*   ALKPHOS 106* 86 111* 112*   ALT 49* 43* 43* 38*   AST 66* 41* 51* 30   BILITOT 1.1 0.8 0.3 0.3   LIPASE 13  --   --   --          No results for input(s): INR in the last 72 hours. No results for input(s): Pasty Dragon in the last 72 hours.     Chronic labs:  Lab Results   Component Value Date    TSH 0.932 02/26/2023    INR 1.0 05/13/2015 Radiology:  Imaging studies reviewed today. ASSESSMENT:    Principal Problem:    Enteritis  Active Problems:    Generalized abdominal pain    Fecal retention    Cocaine abuse ([address]  Resolved Problems:    * No resolved hospital problems. *       PLAN:    Admitted to internal medicine for abdominal pain  Concern for constipation and enteritis of uncertain etiology  General surgery consult no intervention, conservative measures recommended  I am concerned the patient is multiple doses of oxycodone too many pain medications. She appears overmedicated on my exam.  She has had a hypotension as well as hypoxia which may be related to pain medications. Stop oxycodone and morphine. Mild distractible abdominal tenderness to my exam.  Continue Tylenol add tramadol. Stool studies-few fecal leuks, negative stool culture, negative rotavirus  Leukocytosis resolved  IV Rocephin and Flagyl  IV fluids  Potassium replacement  Monitor electrolytes  Counseled on cessation of cocaine abuse with recommendations for peers support group meeting attendance such as CA, NA, AA if abstinence proves difficult  Symptomatic hypotensionafter receiving multiple doses of oxycodone IV fluid bolus, stat lactate and hemoglobin-reviewed and stable  Diet: ADULT DIET; Regular  Code Status: Full Code  PT/OT Eval Status:     DVT Prophylaxis:     Recommended disposition at discharge: Discharge home today    +++++++++++++++++++++++++++++++++++++++++++++++++  Apryl Galvez MD   [facility].  +++++++++++++++++++++++++++++++++++++++++++++++++  NOTE: This report was transcribed using voice recognition software. Every effort was made to ensure accuracy; however, inadvertent computerized transcription errors may be present.

## 2023-02-27 NOTE — CARE COORDINATION
Patient  admitted t for abdominal pain  Concern for constipation and enteritis of uncertain etiology General surgery consult no intervention, conservative measures recommended.  Met with patient at bedside

## 2023-02-27 NOTE — PROGRESS NOTES
CLINICAL PHARMACY NOTE: MEDS TO BEDS    Total # of Prescriptions Filled: 2   The following medications were delivered to the patient:  Metronidazole 500 mg  Cefdinir 300 mg    Additional Documentation:     Patient picked up in the pharmacy

## 2023-03-10 ENCOUNTER — HOSPITAL ENCOUNTER (EMERGENCY)
Age: 53
Discharge: HOME | End: 2023-03-10
Payer: COMMERCIAL

## 2023-03-10 VITALS
BODY MASS INDEX: 32.44 KG/M2 | TEMPERATURE: 97.9 F | DIASTOLIC BLOOD PRESSURE: 93 MMHG | SYSTOLIC BLOOD PRESSURE: 142 MMHG | HEIGHT: 64 IN | RESPIRATION RATE: 16 BRPM | OXYGEN SATURATION: 98 % | WEIGHT: 190 LBS | HEART RATE: 72 BPM

## 2023-03-10 DIAGNOSIS — U07.1 COVID-19: Primary | ICD-10-CM

## 2023-03-10 LAB
INFLUENZA A BY PCR: NOT DETECTED
INFLUENZA B BY PCR: NOT DETECTED
SARS-COV-2, NAAT: DETECTED

## 2023-03-10 PROCEDURE — 87502 INFLUENZA DNA AMP PROBE: CPT

## 2023-03-10 PROCEDURE — 99283 EMERGENCY DEPT VISIT LOW MDM: CPT

## 2023-03-10 PROCEDURE — 87635 SARS-COV-2 COVID-19 AMP PRB: CPT

## 2023-03-10 PROCEDURE — 6370000000 HC RX 637 (ALT 250 FOR IP): Performed by: NURSE PRACTITIONER

## 2023-03-10 RX ORDER — ACETAMINOPHEN 500 MG
1000 TABLET ORAL ONCE
Status: COMPLETED | OUTPATIENT
Start: 2023-03-10 | End: 2023-03-10

## 2023-03-10 RX ORDER — ONDANSETRON 4 MG/1
4 TABLET, ORALLY DISINTEGRATING ORAL 3 TIMES DAILY PRN
Qty: 21 TABLET | Refills: 0 | Status: SHIPPED | OUTPATIENT
Start: 2023-03-10

## 2023-03-10 RX ADMIN — ACETAMINOPHEN 1000 MG: 500 TABLET ORAL at 09:41

## 2023-03-10 NOTE — DISCHARGE INSTRUCTIONS
Alternate Tylenol and Motrin as needed for body aches and fevers. Establish care with a primary care provider. If you do not have 1 I have attached information for you to do so. Be sure you are staying well-hydrated and drinking plenty of fluids. You can take Zofran as needed for episodes of nausea and vomiting. Return to the ER for any worsening symptoms such as chest pain, shortness of breath.

## 2023-03-10 NOTE — ED NOTES
Discharge instructions given, medications and follow up instructions reviewed. Patient verbalized understanding, no other noted or stated problems at this time. Patient will follow up with physicians as directed.         Ariel Spaulding RN  03/10/23 [id number]

## 2023-03-11 ENCOUNTER — HOSPITAL ENCOUNTER (EMERGENCY)
Age: 53
Discharge: HOME | End: 2023-03-11
Attending: EMERGENCY MEDICINE
Payer: COMMERCIAL

## 2023-03-11 ENCOUNTER — APPOINTMENT (OUTPATIENT)
Dept: GENERAL RADIOLOGY | Age: 53
End: 2023-03-11
Payer: COMMERCIAL

## 2023-03-11 VITALS
SYSTOLIC BLOOD PRESSURE: 137 MMHG | TEMPERATURE: 98.4 F | DIASTOLIC BLOOD PRESSURE: 92 MMHG | RESPIRATION RATE: 18 BRPM | HEART RATE: 80 BPM | OXYGEN SATURATION: 98 %

## 2023-03-11 DIAGNOSIS — U07.1 COVID-19: Primary | ICD-10-CM

## 2023-03-11 DIAGNOSIS — J45.901 MILD ASTHMA WITH EXACERBATION, UNSPECIFIED WHETHER PERSISTENT: ICD-10-CM

## 2023-03-11 LAB
ALBUMIN SERPL-MCNC: 3.7 G/DL (ref 3.5–5.2)
ALP BLD-CCNC: 86 U/L (ref 35–104)
ALT SERPL-CCNC: 25 U/L (ref 0–32)
ANION GAP SERPL CALCULATED.3IONS-SCNC: 7 MMOL/L (ref 7–16)
AST SERPL-CCNC: 25 U/L (ref 0–31)
BASOPHILS ABSOLUTE: 0.03 E9/L (ref 0–0.2)
BASOPHILS RELATIVE PERCENT: 0.4 % (ref 0–2)
BILIRUB SERPL-MCNC: 0.2 MG/DL (ref 0–1.2)
BUN BLDV-MCNC: 10 MG/DL (ref 6–20)
CALCIUM SERPL-MCNC: 9.2 MG/DL (ref 8.6–10.2)
CHLORIDE BLD-SCNC: 100 MMOL/L (ref 98–107)
CO2: 30 MMOL/L (ref 22–29)
CREAT SERPL-MCNC: 0.9 MG/DL (ref 0.5–1)
D DIMER: <200 NG/ML DDU
EKG ATRIAL RATE: 71 BPM
EKG P AXIS: 39 DEGREES
EKG P-R INTERVAL: 200 MS
EKG Q-T INTERVAL: 408 MS
EKG QRS DURATION: 88 MS
EKG QTC CALCULATION (BAZETT): 443 MS
EKG R AXIS: 52 DEGREES
EKG T AXIS: 45 DEGREES
EKG VENTRICULAR RATE: 71 BPM
EOSINOPHILS ABSOLUTE: 0.14 E9/L (ref 0.05–0.5)
EOSINOPHILS RELATIVE PERCENT: 2.1 % (ref 0–6)
GFR SERPL CREATININE-BSD FRML MDRD: >60 ML/MIN/1.73
GLUCOSE BLD-MCNC: 146 MG/DL (ref 74–99)
HCT VFR BLD CALC: 42.6 % (ref 34–48)
HEMOGLOBIN: 13.7 G/DL (ref 11.5–15.5)
IMMATURE GRANULOCYTES #: 0.03 E9/L
IMMATURE GRANULOCYTES %: 0.4 % (ref 0–5)
LACTIC ACID, SEPSIS: 1.3 MMOL/L (ref 0.5–1.9)
LYMPHOCYTES ABSOLUTE: 0.93 E9/L (ref 1.5–4)
LYMPHOCYTES RELATIVE PERCENT: 13.8 % (ref 20–42)
MCH RBC QN AUTO: 28.4 PG (ref 26–35)
MCHC RBC AUTO-ENTMCNC: 32.2 % (ref 32–34.5)
MCV RBC AUTO: 88.2 FL (ref 80–99.9)
MONOCYTES ABSOLUTE: 0.42 E9/L (ref 0.1–0.95)
MONOCYTES RELATIVE PERCENT: 6.2 % (ref 2–12)
NEUTROPHILS ABSOLUTE: 5.2 E9/L (ref 1.8–7.3)
NEUTROPHILS RELATIVE PERCENT: 77.1 % (ref 43–80)
PDW BLD-RTO: 13.3 FL (ref 11.5–15)
PLATELET # BLD: 300 E9/L (ref 130–450)
PMV BLD AUTO: 9.7 FL (ref 7–12)
POTASSIUM SERPL-SCNC: 3.7 MMOL/L (ref 3.5–5)
PRO-BNP: 202 PG/ML (ref 0–125)
RBC # BLD: 4.83 E12/L (ref 3.5–5.5)
SODIUM BLD-SCNC: 137 MMOL/L (ref 132–146)
TOTAL PROTEIN: 6.9 G/DL (ref 6.4–8.3)
TROPONIN, HIGH SENSITIVITY: 7 NG/L (ref 0–9)
WBC # BLD: 6.8 E9/L (ref 4.5–11.5)

## 2023-03-11 PROCEDURE — 71046 X-RAY EXAM CHEST 2 VIEWS: CPT

## 2023-03-11 PROCEDURE — 80053 COMPREHEN METABOLIC PANEL: CPT

## 2023-03-11 PROCEDURE — 94664 DEMO&/EVAL PT USE INHALER: CPT

## 2023-03-11 PROCEDURE — 6360000002 HC RX W HCPCS: Performed by: EMERGENCY MEDICINE

## 2023-03-11 PROCEDURE — 83880 ASSAY OF NATRIURETIC PEPTIDE: CPT

## 2023-03-11 PROCEDURE — 83605 ASSAY OF LACTIC ACID: CPT

## 2023-03-11 PROCEDURE — 85025 COMPLETE CBC W/AUTO DIFF WBC: CPT

## 2023-03-11 PROCEDURE — 84484 ASSAY OF TROPONIN QUANT: CPT

## 2023-03-11 PROCEDURE — 96375 TX/PRO/DX INJ NEW DRUG ADDON: CPT

## 2023-03-11 PROCEDURE — 85378 FIBRIN DEGRADE SEMIQUANT: CPT

## 2023-03-11 PROCEDURE — 96374 THER/PROPH/DIAG INJ IV PUSH: CPT

## 2023-03-11 PROCEDURE — 6370000000 HC RX 637 (ALT 250 FOR IP): Performed by: EMERGENCY MEDICINE

## 2023-03-11 PROCEDURE — 99285 EMERGENCY DEPT VISIT HI MDM: CPT

## 2023-03-11 PROCEDURE — 93005 ELECTROCARDIOGRAM TRACING: CPT | Performed by: EMERGENCY MEDICINE

## 2023-03-11 RX ORDER — PREDNISONE 20 MG/1
20 TABLET ORAL 2 TIMES DAILY
Qty: 10 TABLET | Refills: 0 | Status: SHIPPED | OUTPATIENT
Start: 2023-03-11 | End: 2023-03-16

## 2023-03-11 RX ORDER — IPRATROPIUM BROMIDE AND ALBUTEROL SULFATE 2.5; .5 MG/3ML; MG/3ML
1 SOLUTION RESPIRATORY (INHALATION) ONCE
Status: COMPLETED | OUTPATIENT
Start: 2023-03-11 | End: 2023-03-11

## 2023-03-11 RX ORDER — ONDANSETRON 2 MG/ML
4 INJECTION INTRAMUSCULAR; INTRAVENOUS ONCE
Status: COMPLETED | OUTPATIENT
Start: 2023-03-11 | End: 2023-03-11

## 2023-03-11 RX ORDER — ALBUTEROL SULFATE 90 UG/1
2 AEROSOL, METERED RESPIRATORY (INHALATION) 4 TIMES DAILY PRN
Qty: 18 G | Refills: 0 | Status: SHIPPED | OUTPATIENT
Start: 2023-03-11

## 2023-03-11 RX ORDER — BENZONATATE 100 MG/1
100 CAPSULE ORAL 2 TIMES DAILY PRN
Qty: 14 CAPSULE | Refills: 0 | Status: SHIPPED | OUTPATIENT
Start: 2023-03-11 | End: 2023-03-18

## 2023-03-11 RX ORDER — METHYLPREDNISOLONE SODIUM SUCCINATE 125 MG/2ML
60 INJECTION, POWDER, LYOPHILIZED, FOR SOLUTION INTRAMUSCULAR; INTRAVENOUS ONCE
Status: COMPLETED | OUTPATIENT
Start: 2023-03-11 | End: 2023-03-11

## 2023-03-11 RX ADMIN — METHYLPREDNISOLONE SODIUM SUCCINATE 60 MG: 125 INJECTION, POWDER, FOR SOLUTION INTRAMUSCULAR; INTRAVENOUS at 13:47

## 2023-03-11 RX ADMIN — IPRATROPIUM BROMIDE AND ALBUTEROL SULFATE 1 AMPULE: 2.5; .5 SOLUTION RESPIRATORY (INHALATION) at 13:30

## 2023-03-11 RX ADMIN — ONDANSETRON 4 MG: 2 INJECTION INTRAMUSCULAR; INTRAVENOUS at 13:47

## 2023-03-11 ASSESSMENT — PAIN SCALES - GENERAL: PAINLEVEL_OUTOF10: 8

## 2023-03-11 ASSESSMENT — PAIN - FUNCTIONAL ASSESSMENT: PAIN_FUNCTIONAL_ASSESSMENT: 0-10

## 2023-03-11 NOTE — ED PROVIDER NOTES
[address]        Pt Name: Tony Byers  MRN: [id number]  [place] 1970  Date of evaluation: 3/11/2023  Provider: Rody John MD  PCP: No primary care provider on file. Note Started: 12:58 PM EST 3/11/23    CHIEF COMPLAINT       Chief Complaint   Patient presents with    Positive For Covid-19     SOB, nasal congestion, fever, was seen here Friday and tested positve for COVID, pt states \"they only gave me tylenol here\"    Shortness of Breath    Fever    Nasal Congestion       HISTORY OF PRESENT ILLNESS: 1 or more Elements   History From: Patient    Limitations to history : None    Tony Byers is a 45 y.o. female who presents from home with increased cough and congestion. Patient states she had URI symptoms for the last 3 days nasal congestion cough but no fevers she has a history of asthma and she does smoke half a pack of cigarettes a day. She was seen in the ER yesterday and tested positive for COVID. No hypoxia no increased work of breathing. She was stable for discharge. She came back today because she had that she states the coughing was worse and she fell like she was wheezing some more today. No chest pain no pleuritic pain. She has inhalers at home. She reports nausea but no vomiting no abdominal pain no headache or neck stiffness. No visual changes. Nursing Notes were all reviewed and agreed with or any disagreements were addressed in the HPI. REVIEW OF EXTERNAL NOTE :       I reviewed hospitalist inpatient discharge note from 2/27/2023. Patient been admitted for 3 days for abdominal pain and obstipation and enteritis. Patient was treated with Rocephin and Flagyl and able to be discharged home. REVIEW OF SYSTEMS :           Positives and Pertinent negatives as per HPI.      SURGICAL HISTORY     Past Surgical History:   Procedure Laterality Date    LYMPH NODE DISSECTION 2010    back    [address]       Discharge Medication List as of 3/11/2023  3:17 PM        CONTINUE these medications which have NOT CHANGED    Details   ondansetron (ZOFRAN-ODT) 4 MG disintegrating tablet Take 1 tablet by mouth 3 times daily as needed for Nausea or Vomiting, Disp-21 tablet, R-0Print      gabapentin (NEURONTIN) 400 MG capsule Take 800 mg by mouth 3 times daily. Historical Med      meloxicam (MOBIC) 15 MG tablet Take 15 mg by mouth as needed for PainHistorical Med      Levocetirizine Dihydrochloride (XYZAL PO) Take by mouth as neededHistorical Med      atorvastatin (LIPITOR) 40 MG tablet Take 40 mg by mouth at bedtimeHistorical Med      venlafaxine (EFFEXOR) 75 MG tablet Take 150 mg by mouth daily Historical Med             ALLERGIES     Amoxicillin    FAMILYHISTORY     No family history on file.      SOCIAL HISTORY       Social History     Tobacco Use    Smoking status: Former     Packs/day: 0.50     Years: 10.00     Pack years: 5.00     Types: Cigarettes     Quit date: 2014     Years since quittin.2    Smokeless tobacco: Never   Vaping Use    Vaping Use: Never used   Substance Use Topics    Alcohol use: No    Drug use: No       SCREENINGS        [place] Coma Scale  Eye Opening: Spontaneous  Best Verbal Response: Oriented  Best Motor Response: Obeys commands  Miguel Coma Scale Score: 15                CIWA Assessment  BP: (!) 137/92  Heart Rate: 80           PHYSICAL EXAM  1 or more Elements     ED Triage Vitals   BP Temp Temp Source Heart Rate Resp SpO2 Height Weight   23 1226 23 1225 23 1225 23 1225 23 1225 23 1225 -- --   (!) 137/92 98.4 °F (36.9 °C) Oral 88 16 97 %                  Constitutional/General: Alert and oriented x3; overweight, no acute distress pleasant speaking in full sentences  Head: Normocephalic and atraumatic  Eyes: PERRL, EOMI, conjunctiva normal, sclera non icteric  ENT: Oropharynx clear, handling secretions, no trismus, no asymmetry of the posterior oropharynx or uvular edema  Neck: Supple, full ROM, no stridor, no meningeal signs  Respiratory: Lungs with good air movement bilaterally mild scattered expiratory wheezes bilaterally no tachypnea accessory muscle use no retractions able speak in full sentences no rhonchi or rales. Not in respiratory distress  Cardiovascular:  Regular rate. Regular rhythm. No murmurs, no gallops, no rubs. 2+ distal pulses. Equal extremity pulses. Chest: No chest wall tenderness  GI:  Abdomen Soft, Non tender, Non distended. No rebound, guarding, or rigidity. No pulsatile masses. Musculoskeletal: Moves all extremities x 4. Warm and well perfused, no clubbing, no cyanosis, no edema. Capillary refill <3 seconds  Integument: skin warm and dry. No rashes. Neurologic: GCS 15, no focal deficits, symmetric strength 5/5 in the upper and lower extremities bilaterally  Psychiatric: Normal Affect            DIAGNOSTIC RESULTS   LABS:      I have personally reviewed and interpreted all laboratory and imaging results for this patient. Results are listed below.      LABS:  Results for orders placed or performed during the hospital encounter of 03/11/23   CBC with Auto Differential   Result Value Ref Range    WBC 6.8 4.5 - 11.5 E9/L    RBC 4.83 3.50 - 5.50 E12/L    Hemoglobin 13.7 11.5 - 15.5 g/dL    Hematocrit 42.6 34.0 - 48.0 %    MCV 88.2 80.0 - 99.9 fL    MCH 28.4 26.0 - 35.0 pg    MCHC 32.2 32.0 - 34.5 %    RDW 13.3 11.5 - 15.0 fL    Platelets 069 800 - 612 E9/L    MPV 9.7 7.0 - 12.0 fL    Neutrophils % 77.1 43.0 - 80.0 %    Immature Granulocytes % 0.4 0.0 - 5.0 %    Lymphocytes % 13.8 (L) 20.0 - 42.0 %    Monocytes % 6.2 2.0 - 12.0 %    Eosinophils % 2.1 0.0 - 6.0 %    Basophils % 0.4 0.0 - 2.0 %    Neutrophils Absolute 5.20 1.80 - 7.30 E9/L    Immature Granulocytes # 0.03 E9/L    Lymphocytes Absolute 0.93 (L) 1.50 - 4.00 E9/L    Monocytes Absolute 0.42 0.10 - 0.95 E9/L    Eosinophils Absolute 0.14 0.05 - 0.50 E9/L    Basophils Absolute 0.03 0.00 - 0.20 E9/L   Comprehensive Metabolic Panel   Result Value Ref Range    Sodium 137 132 - 146 mmol/L    Potassium 3.7 3.5 - 5.0 mmol/L    Chloride 100 98 - 107 mmol/L    CO2 30 (H) 22 - 29 mmol/L    Anion Gap 7 7 - 16 mmol/L    Glucose 146 (H) 74 - 99 mg/dL    BUN 10 6 - 20 mg/dL    Creatinine 0.9 0.5 - 1.0 mg/dL    Est, Glom Filt Rate >60 >=60 mL/min/1.73    Calcium 9.2 8.6 - 10.2 mg/dL    Total Protein 6.9 6.4 - 8.3 g/dL    Albumin 3.7 3.5 - 5.2 g/dL    Total Bilirubin 0.2 0.0 - 1.2 mg/dL    Alkaline Phosphatase 86 35 - 104 U/L    ALT 25 0 - 32 U/L    AST 25 0 - 31 U/L   Troponin   Result Value Ref Range    Troponin, High Sensitivity 7 0 - 9 ng/L   Brain Natriuretic Peptide   Result Value Ref Range    Pro- (H) 0 - 125 pg/mL   D-Dimer, Quantitative   Result Value Ref Range    D-Dimer, Quant <200 ng/mL DDU   Lactate, Sepsis   Result Value Ref Range    Lactic Acid, Sepsis 1.3 0.5 - 1.9 mmol/L   EKG 12 Lead   Result Value Ref Range    Ventricular Rate 71 BPM    Atrial Rate 71 BPM    P-R Interval 200 ms    QRS Duration 88 ms    Q-T Interval 408 ms    QTc Calculation (Bazett) 443 ms    P Axis 39 degrees    R Axis 52 degrees    T Axis 45 degrees             EKG Interpretation  Interpreted by emergency department physician, Lucia Gates MD    EKG shows normal sinus rhythm at 71 beats a minute no signs of ST changes or ST elevation QTc 443. No change from prior EKG. EKG inter by myself. RADIOLOGY:   Non-plain film images such as CT, Ultrasound and MRI are read by the radiologist. Plain radiographic images are visualized and preliminarily interpreted by the ED Provider with the below findings:    No focal infiltrate seen on chest x-ray. Interpretation per the Radiologist below, if available at the time of this note:    XR CHEST (2 VW)   Final Result   No acute process. No results found.     No results found.    PROCEDURES   Unless otherwise noted below, none          CRITICAL CARE TIME (.cct)   none    PAST MEDICAL HISTORY/Chronic Conditions Affecting Care      has a past medical history of Asthma, Cellulitis, Diverticulitis, Diverticulosis, HTN (hypertension) (10/03/2011), and Hyperlipidemia. EMERGENCY DEPARTMENT COURSE    Vitals:    Vitals:    03/11/23 1225 03/11/23 1226 03/11/23 1330   BP:  (!) 137/92    Pulse: 88  80   Resp: 16  18   Temp: 98.4 °F (36.9 °C)     TempSrc: Oral     SpO2: 97%  98%       Patient was given the following medications:  Medications   ipratropium-albuterol (DUONEB) nebulizer solution 1 ampule (1 ampule Inhalation Given 3/11/23 1330)   methylPREDNISolone sodium (SOLU-MEDROL) injection 60 mg (60 mg IntraVENous Given 3/11/23 1347)   ondansetron (ZOFRAN) injection 4 mg (4 mg IntraVENous Given 3/11/23 1347)           Is this patient to be included in the SEP-1 core measure due to severe sepsis or septic shock? No Exclusion criteria - the patient is NOT to be included for SEP-1 Core Measure due to: 2+ SIRS criteria are not met      Medical Decision Making/Differential Diagnosis:    CC/HPI Summary, Social Determinants of health, Records Reviewed, DDx, testing done/not done, ED Course, Reassessment, disposition considerations/shared decision making with patient, consults, disposition:        Vital signs upon arrival BP (!) 137/92   Pulse 80   Temp 98.4 °F (36.9 °C) (Oral)   Resp 18   SpO2 98%     Jared Roberts is a 45 y.o. female who presents to the ED for 3 days of URI symptoms nasal congestion cough. She is an asthmatic and smokes as well. She was in the ER yesterday tested positive for COVID. She denies any chest pain or pleuritic pain but felt she was wheezing more increased shortness of breath so she came in for further evaluation. No hypoxia. He has mild wheezing on exam she is given a DuoNeb treatment and steroids.   Will check ambulatory pulse ox lab work EKG chest x-ray. Differential diagnosis (includes but not limited to): COVID, asthma exacerbation, bronchitis, PE, ACS dysrhythmia dehydration      Chronic conditions:  has a past medical history of Asthma, Cellulitis, Diverticulitis, Diverticulosis, HTN (hypertension), and Hyperlipidemia. ED Course Summary:(labs and imaging reviewed, interventions, reassessment, consults,shared decision making with patient, disposition)      She was given 1 DuoNeb treatment as well as 60 mg of IV Solu-Medrol and 4 mg of IV Zofran. I did confirm patient's recent COVID-positive test from 2 days ago. EKG shows normal sinus rhythm at 71 beats a minute no signs of ST changes no ST elevation. No signs of STEMI. CBC was white count was 6.8 hemoglobin stable 13.7 electrolytes within normal limits. Troponin was negative BNP was normal D-dimer was negative ruling out PE lactic acid normal at 1.3 CT chest x-ray showed no acute process no focal infiltrate no focal fluid overload. Patient was ambulated she had no hypoxia with ambulation. She feels better after DuoNeb treatment much better aeration bilaterally. She will be given a refill for her inhaler given prednisone for 5 days at home orally as well as Tessalon Perles for cough. She is very comfortable with this plan no distress no hypoxia stable for discharge she will be given local PCP for outpatient follow-up. She was told return for any new worsening symptoms stable at discharge. Social determinants affecting disposition:   Patient does not have an assigned PCP, but she is comfortable with being assigned a [facility] PCP family practice for follow-up. She is comfortable with this plan. ED Course as of 03/12/23 1939   Sat Mar 11, 2023   1306 Patient states she had COVID a year ago and was on Paxlovid and it \"did not help and made me feel worse\". She does not want to be placed on Paxlovid.  [KK]   1508 Patient maintain saturation between 90 to 96% on room air with ambulation. No hypoxia. [PS]   7562 [id number]. She states her albuterol inhaler is almost empty at home she is requesting a refill. Started on prednisone for asthma exacerbation secondary to COVID write for Dionialeksey Holder. Patient stable for discharge no distress good air movement bilaterally. [KK]      ED Course User Index  [KK] Gabe Saunders MD          CONSULTS: (Who and What was discussed)  None        I am the Primary Clinician of Record. FINAL IMPRESSION      1. COVID-19    2.  Mild asthma with exacerbation, unspecified whether persistent Improving         DISPOSITION/PLAN     DISPOSITION Decision To Discharge 03/11/2023 03:03:22 PM      PATIENT REFERRED TO:  Blaise Murcia DO  [address] [place], [place] [address]aliyah Frazier     Call in 2 days  for follow-up appointment    DISCHARGE MEDICATIONS:  Discharge Medication List as of 3/11/2023  3:17 PM        START taking these medications    Details   albuterol sulfate HFA (VENTOLIN HFA) 108 (90 Base) MCG/ACT inhaler Inhale 2 puffs into the lungs 4 times daily as needed for Wheezing, Disp-18 g, R-0Print      predniSONE (DELTASONE) 20 MG tablet Take 1 tablet by mouth 2 times daily for 5 days, Disp-10 tablet, R-0Print      benzonatate (TESSALON) 100 MG capsule Take 1 capsule by mouth 2 times daily as needed for Cough, Disp-14 capsule, R-0Print             DISCONTINUED MEDICATIONS:  Discharge Medication List as of 3/11/2023  3:17 PM                 (Please note that portions of this note were completed with a voice recognition program.  Efforts were made to edit the dictations but occasionally words are mis-transcribed.)    Gabe Saunders MD (electronically signed)            Gabe Saunders MD  03/12/23 1942

## 2023-03-14 LAB
EKG ATRIAL RATE: 71 BPM
EKG P AXIS: 39 DEGREES
EKG P-R INTERVAL: 200 MS
EKG Q-T INTERVAL: 408 MS
EKG QRS DURATION: 88 MS
EKG QTC CALCULATION (BAZETT): 443 MS
EKG R AXIS: 52 DEGREES
EKG T AXIS: 45 DEGREES
EKG VENTRICULAR RATE: 71 BPM

## 2023-03-14 PROCEDURE — 93010 ELECTROCARDIOGRAM REPORT: CPT | Performed by: INTERNAL MEDICINE

## 2023-06-05 ENCOUNTER — HOSPITAL ENCOUNTER (EMERGENCY)
Age: 53
Discharge: HOME | End: 2023-06-05
Attending: EMERGENCY MEDICINE
Payer: COMMERCIAL

## 2023-06-05 VITALS
HEART RATE: 80 BPM | TEMPERATURE: 97.4 F | RESPIRATION RATE: 16 BRPM | OXYGEN SATURATION: 98 % | DIASTOLIC BLOOD PRESSURE: 79 MMHG | SYSTOLIC BLOOD PRESSURE: 128 MMHG | BODY MASS INDEX: 33.99 KG/M2 | WEIGHT: 198 LBS

## 2023-06-05 DIAGNOSIS — Z76.0 ENCOUNTER FOR MEDICATION REFILL: Primary | ICD-10-CM

## 2023-06-05 PROCEDURE — 99283 EMERGENCY DEPT VISIT LOW MDM: CPT

## 2023-06-05 RX ORDER — DULOXETIN HYDROCHLORIDE 60 MG/1
60 CAPSULE, DELAYED RELEASE ORAL 2 TIMES DAILY
Qty: 14 CAPSULE | Refills: 0 | Status: SHIPPED | OUTPATIENT
Start: 2023-06-05 | End: 2023-06-12

## 2023-06-05 RX ORDER — DULOXETIN HYDROCHLORIDE 60 MG/1
60 CAPSULE, DELAYED RELEASE ORAL 2 TIMES DAILY
COMMUNITY
End: 2023-06-05 | Stop reason: SDUPTHER

## 2023-06-05 RX ORDER — GABAPENTIN 400 MG/1
400 CAPSULE ORAL 3 TIMES DAILY
Qty: 21 CAPSULE | Refills: 0 | Status: SHIPPED | OUTPATIENT
Start: 2023-06-05 | End: 2023-06-12

## 2023-06-05 RX ORDER — VENLAFAXINE 75 MG/1
150 TABLET ORAL DAILY
Qty: 14 TABLET | Refills: 0 | Status: SHIPPED | OUTPATIENT
Start: 2023-06-05 | End: 2023-06-12

## 2023-06-05 ASSESSMENT — PAIN - FUNCTIONAL ASSESSMENT: PAIN_FUNCTIONAL_ASSESSMENT: NONE - DENIES PAIN

## 2023-06-05 NOTE — ED PROVIDER NOTES
[address] ENCOUNTER      Pt Name: Alek Harrington  MRN: [id number]  [place] 1970  Date of evaluation: 6/5/2023  Provider: Mayo Marie DO  PCP: No primary care provider on file. Note Started: 12:46 PM EDT 6/5/23    CHIEF COMPLAINT       Chief Complaint   Patient presents with    Medication Refill     Need refill on gabapentin,effexor, cymbalta lat dose 2 weeks ago       HISTORY OF PRESENT ILLNESS: 1 or more Elements   History From: Patient  Limitations to history : None    Alek Harrington is a 45 y.o. female who presents to the ED due to a medication refill. Patient states that she ran out of her gabapentin, Effexor and Cymbalta last week. She says that her last dose of Effexor was roughly 4 days regurgitates that she has MS in the past and experienced withdrawal symptoms. She states that she has been getting mild lightheadedness and dizziness which occurred in the past when she was out of her medications. She denies any recent fever or chills. Denies any headaches or vision changes. She denies any chest pain or shortness of breath. No systemic symptoms at this time. She has no focal neurologic deficits. Nursing Notes were all reviewed and agreed with or any disagreements were addressed in the HPI. REVIEW OF SYSTEMS :    Positives and Pertinent negatives as per HPI.      SURGICAL HISTORY     Past Surgical History:   Procedure Laterality Date    LYMPH NODE DISSECTION  2010    back    TONSILLECTOMY      TOOTH EXTRACTION  2011       [address]       Current Discharge Medication List        CONTINUE these medications which have NOT CHANGED    Details   metFORMIN (GLUCOPHAGE) 500 MG tablet Take 1 tablet by mouth 2 times daily (with meals)      albuterol sulfate HFA (VENTOLIN HFA) 108 (90 Base) MCG/ACT inhaler Inhale 2 puffs into the lungs 4 times daily as needed for Wheezing  Qty: 18 g, Refills: 0

## 2023-06-29 ENCOUNTER — HOSPITAL ENCOUNTER (EMERGENCY)
Age: 53
Discharge: HOME | End: 2023-06-29
Payer: COMMERCIAL

## 2023-06-29 VITALS
HEART RATE: 83 BPM | OXYGEN SATURATION: 95 % | DIASTOLIC BLOOD PRESSURE: 73 MMHG | SYSTOLIC BLOOD PRESSURE: 116 MMHG | RESPIRATION RATE: 16 BRPM | BODY MASS INDEX: 38.28 KG/M2 | HEIGHT: 62 IN | WEIGHT: 208 LBS | TEMPERATURE: 98.5 F

## 2023-06-29 DIAGNOSIS — Z76.0 ENCOUNTER FOR MEDICATION REFILL: Primary | ICD-10-CM

## 2023-06-29 PROCEDURE — 99283 EMERGENCY DEPT VISIT LOW MDM: CPT

## 2023-06-29 RX ORDER — GABAPENTIN 400 MG/1
400 CAPSULE ORAL 3 TIMES DAILY
Qty: 21 CAPSULE | Refills: 0 | Status: SHIPPED | OUTPATIENT
Start: 2023-06-29 | End: 2023-07-06 | Stop reason: SDUPTHER

## 2023-06-29 ASSESSMENT — PAIN DESCRIPTION - PAIN TYPE: TYPE: ACUTE PAIN

## 2023-06-29 ASSESSMENT — PAIN - FUNCTIONAL ASSESSMENT
PAIN_FUNCTIONAL_ASSESSMENT: 0-10
PAIN_FUNCTIONAL_ASSESSMENT: PREVENTS OR INTERFERES SOME ACTIVE ACTIVITIES AND ADLS

## 2023-06-29 ASSESSMENT — PAIN SCALES - GENERAL: PAINLEVEL_OUTOF10: 10

## 2023-06-29 ASSESSMENT — PAIN DESCRIPTION - FREQUENCY: FREQUENCY: CONTINUOUS

## 2023-06-29 ASSESSMENT — PAIN DESCRIPTION - ONSET: ONSET: ON-GOING

## 2023-06-29 ASSESSMENT — PAIN DESCRIPTION - ORIENTATION: ORIENTATION: LEFT;RIGHT

## 2023-06-29 ASSESSMENT — PAIN DESCRIPTION - DESCRIPTORS: DESCRIPTORS: DISCOMFORT;ACHING;SHARP;SHOOTING

## 2023-06-29 ASSESSMENT — PAIN DESCRIPTION - LOCATION: LOCATION: BACK;LEG

## 2023-07-06 ENCOUNTER — HOSPITAL ENCOUNTER (OUTPATIENT)
Dept: GENERAL RADIOLOGY | Age: 53
Discharge: HOME | End: 2023-07-08
Payer: COMMERCIAL

## 2023-07-06 ENCOUNTER — HOSPITAL ENCOUNTER (OUTPATIENT)
Age: 53
Discharge: HOME | End: 2023-07-08
Payer: COMMERCIAL

## 2023-07-06 ENCOUNTER — HOSPITAL ENCOUNTER (OUTPATIENT)
Age: 53
Discharge: HOME | End: 2023-07-06
Payer: COMMERCIAL

## 2023-07-06 ENCOUNTER — OFFICE VISIT (OUTPATIENT)
Dept: INTERNAL MEDICINE | Age: 53
End: 2023-07-06
Payer: COMMERCIAL

## 2023-07-06 VITALS
WEIGHT: 210 LBS | TEMPERATURE: 97.1 F | DIASTOLIC BLOOD PRESSURE: 84 MMHG | BODY MASS INDEX: 38.64 KG/M2 | RESPIRATION RATE: 20 BRPM | HEIGHT: 62 IN | HEART RATE: 77 BPM | SYSTOLIC BLOOD PRESSURE: 122 MMHG

## 2023-07-06 DIAGNOSIS — G89.29 CHRONIC BILATERAL LOW BACK PAIN WITHOUT SCIATICA: ICD-10-CM

## 2023-07-06 DIAGNOSIS — F32.A DEPRESSION, UNSPECIFIED DEPRESSION TYPE: ICD-10-CM

## 2023-07-06 DIAGNOSIS — E78.5 HYPERLIPIDEMIA, UNSPECIFIED HYPERLIPIDEMIA TYPE: ICD-10-CM

## 2023-07-06 DIAGNOSIS — N75.0 BARTHOLIN GLAND CYST: ICD-10-CM

## 2023-07-06 DIAGNOSIS — N75.0 BARTHOLIN CYST: ICD-10-CM

## 2023-07-06 DIAGNOSIS — Z13.9 SCREENING DUE: ICD-10-CM

## 2023-07-06 DIAGNOSIS — Z12.4 PAP SMEAR FOR CERVICAL CANCER SCREENING: ICD-10-CM

## 2023-07-06 DIAGNOSIS — E78.49 OTHER HYPERLIPIDEMIA: ICD-10-CM

## 2023-07-06 DIAGNOSIS — R87.619 ABNORMAL CERVICAL PAPANICOLAOU SMEAR, UNSPECIFIED ABNORMAL PAP FINDING: ICD-10-CM

## 2023-07-06 DIAGNOSIS — E11.69 TYPE 2 DIABETES MELLITUS WITH OTHER SPECIFIED COMPLICATION, UNSPECIFIED WHETHER LONG TERM INSULIN USE (HCC): Primary | ICD-10-CM

## 2023-07-06 DIAGNOSIS — E11.69 TYPE 2 DIABETES MELLITUS WITH OTHER SPECIFIED COMPLICATION, UNSPECIFIED WHETHER LONG TERM INSULIN USE (HCC): ICD-10-CM

## 2023-07-06 DIAGNOSIS — Z12.39 ENCOUNTER FOR SCREENING FOR MALIGNANT NEOPLASM OF BREAST, UNSPECIFIED SCREENING MODALITY: ICD-10-CM

## 2023-07-06 DIAGNOSIS — N83.202 CYST OF LEFT OVARY: ICD-10-CM

## 2023-07-06 DIAGNOSIS — E11.69 TYPE 2 DIABETES MELLITUS WITH OTHER SPECIFIED COMPLICATION, WITHOUT LONG-TERM CURRENT USE OF INSULIN (HCC): ICD-10-CM

## 2023-07-06 DIAGNOSIS — M54.50 CHRONIC BILATERAL LOW BACK PAIN WITHOUT SCIATICA: ICD-10-CM

## 2023-07-06 PROBLEM — E11.9 DM (DIABETES MELLITUS), TYPE 2 (HCC): Status: ACTIVE | Noted: 2023-07-06

## 2023-07-06 PROBLEM — K59.00 FECAL RETENTION: Status: RESOLVED | Noted: 2023-02-25 | Resolved: 2023-07-06

## 2023-07-06 LAB
CHOLESTEROL, TOTAL: 235 MG/DL (ref 0–199)
HBA1C MFR BLD: 6.7 %
HDLC SERPL-MCNC: 39 MG/DL
LDLC SERPL CALC-MCNC: ABNORMAL MG/DL (ref 0–99)
TRIGL SERPL-MCNC: 428 MG/DL (ref 0–149)
VLDLC SERPL CALC-MCNC: ABNORMAL MG/DL

## 2023-07-06 PROCEDURE — G8417 CALC BMI ABV UP PARAM F/U: HCPCS

## 2023-07-06 PROCEDURE — 3074F SYST BP LT 130 MM HG: CPT

## 2023-07-06 PROCEDURE — 3078F DIAST BP <80 MM HG: CPT

## 2023-07-06 PROCEDURE — 99212 OFFICE O/P EST SF 10 MIN: CPT

## 2023-07-06 PROCEDURE — G8427 DOCREV CUR MEDS BY ELIG CLIN: HCPCS

## 2023-07-06 PROCEDURE — 86703 HIV-1/HIV-2 1 RESULT ANTBDY: CPT

## 2023-07-06 PROCEDURE — 3017F COLORECTAL CA SCREEN DOC REV: CPT

## 2023-07-06 PROCEDURE — 36415 COLL VENOUS BLD VENIPUNCTURE: CPT

## 2023-07-06 PROCEDURE — 80061 LIPID PANEL: CPT

## 2023-07-06 PROCEDURE — 80074 ACUTE HEPATITIS PANEL: CPT

## 2023-07-06 PROCEDURE — 99204 OFFICE O/P NEW MOD 45 MIN: CPT

## 2023-07-06 PROCEDURE — 72100 X-RAY EXAM L-S SPINE 2/3 VWS: CPT

## 2023-07-06 PROCEDURE — 1036F TOBACCO NON-USER: CPT

## 2023-07-06 PROCEDURE — 2022F DILAT RTA XM EVC RTNOPTHY: CPT

## 2023-07-06 PROCEDURE — 3044F HG A1C LEVEL LT 7.0%: CPT

## 2023-07-06 PROCEDURE — 83037 HB GLYCOSYLATED A1C HOME DEV: CPT

## 2023-07-06 RX ORDER — METFORMIN HYDROCHLORIDE 500 MG/1
500 TABLET, EXTENDED RELEASE ORAL
Qty: 30 TABLET | Refills: 1 | Status: SHIPPED | OUTPATIENT
Start: 2023-07-06

## 2023-07-06 RX ORDER — GABAPENTIN 400 MG/1
400 CAPSULE ORAL 3 TIMES DAILY
Qty: 90 CAPSULE | Refills: 0 | Status: SHIPPED | OUTPATIENT
Start: 2023-07-06 | End: 2023-08-05

## 2023-07-06 RX ORDER — HYDROCHLOROTHIAZIDE 12.5 MG/1
12.5 TABLET ORAL DAILY
COMMUNITY

## 2023-07-06 RX ORDER — VENLAFAXINE HYDROCHLORIDE 150 MG/1
150 CAPSULE, EXTENDED RELEASE ORAL DAILY
COMMUNITY
Start: 2023-07-02

## 2023-07-06 SDOH — ECONOMIC STABILITY: FOOD INSECURITY: WITHIN THE PAST 12 MONTHS, THE FOOD YOU BOUGHT JUST DIDN'T LAST AND YOU DIDN'T HAVE MONEY TO GET MORE.: NEVER TRUE

## 2023-07-06 SDOH — ECONOMIC STABILITY: HOUSING INSECURITY
IN THE LAST 12 MONTHS, WAS THERE A TIME WHEN YOU DID NOT HAVE A STEADY PLACE TO SLEEP OR SLEPT IN A SHELTER (INCLUDING NOW)?: YES

## 2023-07-06 SDOH — ECONOMIC STABILITY: TRANSPORTATION INSECURITY
IN THE PAST 12 MONTHS, HAS THE LACK OF TRANSPORTATION KEPT YOU FROM MEDICAL APPOINTMENTS OR FROM GETTING MEDICATIONS?: YES

## 2023-07-06 SDOH — ECONOMIC STABILITY: INCOME INSECURITY: IN THE LAST 12 MONTHS, WAS THERE A TIME WHEN YOU WERE NOT ABLE TO PAY THE MORTGAGE OR RENT ON TIME?: YES

## 2023-07-06 SDOH — ECONOMIC STABILITY: FOOD INSECURITY: WITHIN THE PAST 12 MONTHS, YOU WORRIED THAT YOUR FOOD WOULD RUN OUT BEFORE YOU GOT MONEY TO BUY MORE.: NEVER TRUE

## 2023-07-06 SDOH — ECONOMIC STABILITY: TRANSPORTATION INSECURITY
IN THE PAST 12 MONTHS, HAS LACK OF TRANSPORTATION KEPT YOU FROM MEETINGS, WORK, OR FROM GETTING THINGS NEEDED FOR DAILY LIVING?: YES

## 2023-07-06 SDOH — ECONOMIC STABILITY: HOUSING INSECURITY: IN THE LAST 12 MONTHS, HOW MANY PLACES HAVE YOU LIVED?: 1

## 2023-07-06 ASSESSMENT — SOCIAL DETERMINANTS OF HEALTH (SDOH): HOW HARD IS IT FOR YOU TO PAY FOR THE VERY BASICS LIKE FOOD, HOUSING, MEDICAL CARE, AND HEATING?: NOT HARD AT ALL

## 2023-07-06 ASSESSMENT — ENCOUNTER SYMPTOMS
COUGH: 0
ABDOMINAL PAIN: 0
CHEST TIGHTNESS: 0
SHORTNESS OF BREATH: 0
VOMITING: 0
NAUSEA: 0

## 2023-07-06 ASSESSMENT — PATIENT HEALTH QUESTIONNAIRE - PHQ9
SUM OF ALL RESPONSES TO PHQ QUESTIONS 1-9: 2
SUM OF ALL RESPONSES TO PHQ QUESTIONS 1-9: 2
SUM OF ALL RESPONSES TO PHQ9 QUESTIONS 1 & 2: 2
2. FEELING DOWN, DEPRESSED OR HOPELESS: 2
SUM OF ALL RESPONSES TO PHQ QUESTIONS 1-9: 2
1. LITTLE INTEREST OR PLEASURE IN DOING THINGS: 0
SUM OF ALL RESPONSES TO PHQ QUESTIONS 1-9: 2

## 2023-07-06 ASSESSMENT — LIFESTYLE VARIABLES
HOW OFTEN DO YOU HAVE A DRINK CONTAINING ALCOHOL: NEVER
HOW MANY STANDARD DRINKS CONTAINING ALCOHOL DO YOU HAVE ON A TYPICAL DAY: PATIENT DOES NOT DRINK

## 2023-07-06 NOTE — PROGRESS NOTES
[address] [place] [address]  Internal Medicine Residency Clinic    Attending Physician Statement  I have discussed the case, including pertinent history and exam findings with the resident physician. I have seen and examined the patient and the key elements of the encounter have been performed by me. I agree with the assessment, plan and orders as documented by the resident. I have reviewed all pertinent PMHx, PSHx, FamHx, SocialHx, medications, and allergies and updated history as appropriate. Patient here for emergency room follow up. Patient has been following with provider in [place] and now would like to transfer care. Family hx of heart diease, DM, and CVA. Smoking average of 5 cigarettes a day since 14years old. No recent alcohol use. Recent incarceration. Female sexual partner in the past. No menses for about 3 years now. No history of STI. HTN is controlled with current regimen  Depression. Mood is stable, with no SI/HI. DM, non-insulin requiring. A1c 6.7. Off regular formation metformin because of GI symptoms. We discussed lifestyle modification +/- trial of metformin XR. She would like to do both. Chronic lumbar pain - PDMP reviewed, has been getting gabapentin from 1 provider in [place] until the last few months (another provider in [place] then latest refill from ED). Imaging was done years ago. No focal deficits. She is taking gabapentin 400 mg TID which we will refill only as bridge today until she is seen by Pain Management. Referral to Gyn for R labial mass (possible Bartholin cyst), benign appearing cyst in the right ovary, and vaginal dryness and pallor likely from menopause. Need to obtain records from [place]. Chaperone for Intimate Exam  Chaperone was offered as part of the rooming process. Patient declined and agrees to continue with exam without a chaperone. External pelvic examination performed by myself with Dr. Kelsi Bunn present in the room. No inguinal LN.  No vesicles,

## 2023-07-06 NOTE — PROGRESS NOTES
[address] [facility] Internal Medicine      SUBJECTIVE:  Yana Owens (:  1970) is a 45 y.o. female here for evaluation of the following chief complaint(s):  Follow-up  Patient presented to the internal medicine clinic for ED follow-up. Initially patient was planned for medication refill on follow-up. Patient was following up with [facility] in [place] in [place]. Patient moved into [facility] on  to take care for her mother. She works as a . She was recently released from [facility] after being there in nursing home from March to May 2023. She donates plasma 2 times a week. On presentation to the clinic, patient presented with complaints as listed below:  -Complains of chronic back pain with radiculopathy for which she was being prescribed gabapentin 800 mg 3 times daily in the past.  She was complaining of chronic back pain and did visit to the ED recently for medication refill. She stated she was taking 400 mg 3 times daily so planned to prescribe the same dose.    -Also complained of having bumps in the vagina which was associated with pain and itching that started 2 to 3 weeks back. She has noticed 2 different bumps in the labia and was also complaining of some uneasiness in the clitoris region. Did not complain of fever or discharge. She is postmenopausal last menstrual period 5 to 6 years back. Has not been in sexual relationship with anyone in the past 3 months.  -Patient has history of diabetes that was diagnosed 8 years ago and was on metformin. She has not been taking metformin due to diarrhea since last 3 months. Her recent A1c was 6.7%. -She also has history of anxiety and depression and has been on venlafaxine and duloxetine. Patient to be sent to behavioral health for further management.  -Chronic smoker smokes half pack per day. On average of 5 cigarettes /day since age of 14. She does not drink alcohol.   Used to drink in the

## 2023-07-07 LAB
HAV IGM SERPL QL IA: NORMAL
HBV CORE IGM SERPL QL IA: NORMAL
HBV SURFACE AG SERPL QL IA: NORMAL
HCV AB SERPL QL IA: NORMAL
HIV1+2 AB SERPL QL IA: NORMAL

## 2023-07-13 ENCOUNTER — HOSPITAL ENCOUNTER (OUTPATIENT)
Dept: GENERAL RADIOLOGY | Age: 53
Discharge: HOME | End: 2023-07-15
Payer: COMMERCIAL

## 2023-07-13 VITALS — BODY MASS INDEX: 37.54 KG/M2 | WEIGHT: 204 LBS | HEIGHT: 62 IN

## 2023-07-13 DIAGNOSIS — Z13.9 SCREENING DUE: ICD-10-CM

## 2023-07-13 DIAGNOSIS — Z12.39 ENCOUNTER FOR SCREENING FOR MALIGNANT NEOPLASM OF BREAST, UNSPECIFIED SCREENING MODALITY: ICD-10-CM

## 2023-07-13 PROCEDURE — 77063 BREAST TOMOSYNTHESIS BI: CPT

## 2023-09-14 ENCOUNTER — HOSPITAL ENCOUNTER (EMERGENCY)
Age: 53
Discharge: HOME | End: 2023-09-14
Payer: COMMERCIAL

## 2023-09-14 ENCOUNTER — HOSPITAL ENCOUNTER (OUTPATIENT)
Dept: ULTRASOUND IMAGING | Age: 53
Discharge: HOME | End: 2023-09-16
Payer: COMMERCIAL

## 2023-09-14 ENCOUNTER — APPOINTMENT (OUTPATIENT)
Dept: GENERAL RADIOLOGY | Age: 53
End: 2023-09-14
Payer: COMMERCIAL

## 2023-09-14 ENCOUNTER — HOSPITAL ENCOUNTER (OUTPATIENT)
Age: 53
Discharge: HOME | End: 2023-09-16
Payer: COMMERCIAL

## 2023-09-14 VITALS
OXYGEN SATURATION: 98 % | DIASTOLIC BLOOD PRESSURE: 79 MMHG | WEIGHT: 190 LBS | RESPIRATION RATE: 16 BRPM | TEMPERATURE: 98.4 F | SYSTOLIC BLOOD PRESSURE: 122 MMHG | BODY MASS INDEX: 34.96 KG/M2 | HEART RATE: 87 BPM | HEIGHT: 62 IN

## 2023-09-14 DIAGNOSIS — M79.604 RIGHT LEG PAIN: Primary | ICD-10-CM

## 2023-09-14 DIAGNOSIS — M79.604 RIGHT LEG PAIN: ICD-10-CM

## 2023-09-14 LAB
ANION GAP SERPL CALCULATED.3IONS-SCNC: 11 MMOL/L (ref 7–16)
BASOPHILS # BLD: 0.03 K/UL (ref 0–0.2)
BASOPHILS NFR BLD: 0 % (ref 0–2)
BUN SERPL-MCNC: 13 MG/DL (ref 6–20)
CALCIUM SERPL-MCNC: 9.5 MG/DL (ref 8.6–10.2)
CHLORIDE SERPL-SCNC: 103 MMOL/L (ref 98–107)
CO2 SERPL-SCNC: 28 MMOL/L (ref 22–29)
CREAT SERPL-MCNC: 0.7 MG/DL (ref 0.5–1)
D DIMER: 227 NG/ML DDU (ref 0–232)
EOSINOPHIL # BLD: 0.17 K/UL (ref 0.05–0.5)
EOSINOPHILS RELATIVE PERCENT: 2 % (ref 0–6)
ERYTHROCYTE [DISTWIDTH] IN BLOOD BY AUTOMATED COUNT: 12.9 % (ref 11.5–15)
GFR SERPL CREATININE-BSD FRML MDRD: >60 ML/MIN/1.73M2
GLUCOSE SERPL-MCNC: 245 MG/DL (ref 74–99)
HCT VFR BLD AUTO: 41.7 % (ref 34–48)
HGB BLD-MCNC: 14.3 G/DL (ref 11.5–15.5)
IMM GRANULOCYTES # BLD AUTO: 0.04 K/UL (ref 0–0.58)
IMM GRANULOCYTES NFR BLD: 0 % (ref 0–5)
LYMPHOCYTES NFR BLD: 1.25 K/UL (ref 1.5–4)
LYMPHOCYTES RELATIVE PERCENT: 13 % (ref 20–42)
MCH RBC QN AUTO: 29.4 PG (ref 26–35)
MCHC RBC AUTO-ENTMCNC: 34.3 G/DL (ref 32–34.5)
MCV RBC AUTO: 85.6 FL (ref 80–99.9)
MONOCYTES NFR BLD: 0.34 K/UL (ref 0.1–0.95)
MONOCYTES NFR BLD: 4 % (ref 2–12)
NEUTROPHILS NFR BLD: 81 % (ref 43–80)
NEUTS SEG NFR BLD: 7.55 K/UL (ref 1.8–7.3)
PLATELET # BLD AUTO: 252 K/UL (ref 130–450)
PMV BLD AUTO: 9.5 FL (ref 7–12)
POTASSIUM SERPL-SCNC: 3.5 MMOL/L (ref 3.5–5)
RBC # BLD AUTO: 4.87 M/UL (ref 3.5–5.5)
SODIUM SERPL-SCNC: 142 MMOL/L (ref 132–146)
WBC OTHER # BLD: 9.4 K/UL (ref 4.5–11.5)

## 2023-09-14 PROCEDURE — 80048 BASIC METABOLIC PNL TOTAL CA: CPT

## 2023-09-14 PROCEDURE — 85379 FIBRIN DEGRADATION QUANT: CPT

## 2023-09-14 PROCEDURE — 73560 X-RAY EXAM OF KNEE 1 OR 2: CPT

## 2023-09-14 PROCEDURE — 85025 COMPLETE CBC W/AUTO DIFF WBC: CPT

## 2023-09-14 PROCEDURE — 96375 TX/PRO/DX INJ NEW DRUG ADDON: CPT

## 2023-09-14 PROCEDURE — 6360000002 HC RX W HCPCS: Performed by: NURSE PRACTITIONER

## 2023-09-14 PROCEDURE — 99284 EMERGENCY DEPT VISIT MOD MDM: CPT

## 2023-09-14 PROCEDURE — 96374 THER/PROPH/DIAG INJ IV PUSH: CPT

## 2023-09-14 PROCEDURE — 93971 EXTREMITY STUDY: CPT

## 2023-09-14 PROCEDURE — 2500000003 HC RX 250 WO HCPCS: Performed by: NURSE PRACTITIONER

## 2023-09-14 PROCEDURE — 73610 X-RAY EXAM OF ANKLE: CPT

## 2023-09-14 RX ORDER — HYDROMORPHONE HYDROCHLORIDE 1 MG/ML
1 INJECTION, SOLUTION INTRAMUSCULAR; INTRAVENOUS; SUBCUTANEOUS ONCE
Status: COMPLETED | OUTPATIENT
Start: 2023-09-14 | End: 2023-09-14

## 2023-09-14 RX ORDER — OXYCODONE HYDROCHLORIDE AND ACETAMINOPHEN 5; 325 MG/1; MG/1
1 TABLET ORAL EVERY 6 HOURS PRN
Qty: 12 TABLET | Refills: 0 | Status: SHIPPED | OUTPATIENT
Start: 2023-09-14 | End: 2023-09-17

## 2023-09-14 RX ORDER — KETOROLAC TROMETHAMINE 30 MG/ML
15 INJECTION, SOLUTION INTRAMUSCULAR; INTRAVENOUS ONCE
Status: COMPLETED | OUTPATIENT
Start: 2023-09-14 | End: 2023-09-14

## 2023-09-14 RX ORDER — TRAMADOL HYDROCHLORIDE 50 MG/1
50 TABLET ORAL 2 TIMES DAILY
COMMUNITY

## 2023-09-14 RX ORDER — PROCHLORPERAZINE EDISYLATE 5 MG/ML
10 INJECTION INTRAMUSCULAR; INTRAVENOUS ONCE
Status: COMPLETED | OUTPATIENT
Start: 2023-09-14 | End: 2023-09-14

## 2023-09-14 RX ADMIN — KETOROLAC TROMETHAMINE 15 MG: 30 INJECTION, SOLUTION INTRAMUSCULAR; INTRAVENOUS at 17:40

## 2023-09-14 RX ADMIN — HYDROMORPHONE HYDROCHLORIDE 1 MG: 1 INJECTION, SOLUTION INTRAMUSCULAR; INTRAVENOUS; SUBCUTANEOUS at 19:33

## 2023-09-14 RX ADMIN — PROCHLORPERAZINE EDISYLATE 10 MG: 5 INJECTION INTRAMUSCULAR; INTRAVENOUS at 19:32

## 2023-09-14 ASSESSMENT — PAIN DESCRIPTION - LOCATION
LOCATION: LEG
LOCATION: KNEE;LEG
LOCATION: ANKLE;LEG;KNEE
LOCATION: KNEE;LEG

## 2023-09-14 ASSESSMENT — PAIN SCALES - GENERAL
PAINLEVEL_OUTOF10: 9
PAINLEVEL_OUTOF10: 2

## 2023-09-14 ASSESSMENT — PAIN DESCRIPTION - ORIENTATION
ORIENTATION: RIGHT

## 2023-09-14 ASSESSMENT — PAIN DESCRIPTION - FREQUENCY: FREQUENCY: CONTINUOUS

## 2023-09-14 ASSESSMENT — PAIN DESCRIPTION - DESCRIPTORS
DESCRIPTORS: ACHING;DISCOMFORT;SORE
DESCRIPTORS: ACHING;DISCOMFORT;THROBBING
DESCRIPTORS: ACHING;DISCOMFORT;CRAMPING
DESCRIPTORS: THROBBING;BURNING;ITCHING

## 2023-09-14 ASSESSMENT — PAIN DESCRIPTION - PAIN TYPE: TYPE: ACUTE PAIN

## 2023-09-14 ASSESSMENT — PAIN DESCRIPTION - ONSET: ONSET: SUDDEN

## 2023-09-14 ASSESSMENT — PAIN - FUNCTIONAL ASSESSMENT: PAIN_FUNCTIONAL_ASSESSMENT: 0-10

## 2023-09-14 NOTE — ED PROVIDER NOTES
Differential(!):    WBC 9.4   RBC 4.87   Hemoglobin Quant 14.3   Hematocrit 41.7   MCV 85.6   MCH 29.4   MCHC 34.3   RDW 12.9   Platelet Count 071   MPV 9.5   Neutrophils % 81(!)   Lymphocyte % 13(!)   Monocytes % 4   Eosinophils % 2   Basophils % 0   Immature Granulocytes 0   Neutrophils Absolute 7.55(!)   Lymphocytes Absolute 1.25(!)   Monocytes Absolute 0.34   Eosinophils Absolute 0.17   Basophils Absolute 0.03   Absolute Immature Granulocyte 0.04  Unremarkable CBC. [CS]      ED Course User Index  [CS] Lizet Simpson, APRN - CNP     Consults:   None    Procedure(s):  None    MDM:      This is a 57-year-old female patient with no significant past medical history who presents with a 2-week history of right knee and right ankle pain with no known injury. This morning she woke up with discoloration to her anterior shin with worsening pain. She was evaluated in urgent care and was sent to the ER for ultrasound. Unfortunately we do not have ultrasound in this department at this time, I discussed this with patient. I did perform basic labs to evaluate for a possible septic arthritis, D-dimer to predict probability of DVT,, plain film x-rays of the right knee and right ankle were obtained as well. On exam patient has moderate tenderness globally over the entire knee, no joint laxity noticed with varus and valgus stress, negative anterior and posterior drawers. Her skin is warm and well perfused, she does have a few abrasions that she states that she got from a lawnmowing accident. The spider bite to the lateral aspect of the right knee is only a tiny scratch, there is no erythema, induration, calor noted. She does have moderate ecchymosis overlying the right medial ankle. She has no posterior calf tenderness, no palpable cords, negative Homans' sign. Her skin is warm and well-perfused, cap refill is less than 2 seconds, pedal, posterior tibial, popliteal pulses are intact.     Plain film x-rays are

## 2023-09-14 NOTE — DISCHARGE INSTRUCTIONS
Your labs were all normal, your x-rays show a lot of arthritis, some of the swelling may be from the arthritis. Please go to [facility] to have an ultrasound done of your right lower leg to evaluate for a DVT. You will go in through the ER let them know you have an outpatient order for an ultrasound, you will then wait in the department until I have spoken to you, the radiologist will call me directly and then I will speak to you in the ER. I also sent a prescription for Percocet to your pharmacy for pain.

## 2023-09-15 NOTE — ED NOTES
Called [facility] dept. , spoke to [place] and updated on US order. Call [facility] with results.      Omid Schumacher RN  09/14/23 2023

## 2023-10-03 ENCOUNTER — APPOINTMENT (OUTPATIENT)
Dept: CT IMAGING | Age: 53
End: 2023-10-03
Payer: COMMERCIAL

## 2023-10-03 ENCOUNTER — HOSPITAL ENCOUNTER (EMERGENCY)
Age: 53
Discharge: HOME | End: 2023-10-03
Attending: EMERGENCY MEDICINE
Payer: COMMERCIAL

## 2023-10-03 VITALS
OXYGEN SATURATION: 95 % | HEART RATE: 71 BPM | RESPIRATION RATE: 16 BRPM | TEMPERATURE: 98 F | DIASTOLIC BLOOD PRESSURE: 84 MMHG | SYSTOLIC BLOOD PRESSURE: 148 MMHG

## 2023-10-03 DIAGNOSIS — R10.9 FLANK PAIN: Primary | ICD-10-CM

## 2023-10-03 DIAGNOSIS — T14.8XXA MUSCLE STRAIN: ICD-10-CM

## 2023-10-03 LAB
ALBUMIN SERPL-MCNC: 4 G/DL (ref 3.5–5.2)
ALP SERPL-CCNC: 76 U/L (ref 35–104)
ALT SERPL-CCNC: 30 U/L (ref 0–32)
ANION GAP SERPL CALCULATED.3IONS-SCNC: 10 MMOL/L (ref 7–16)
AST SERPL-CCNC: 25 U/L (ref 0–31)
BACTERIA URNS QL MICRO: ABNORMAL
BASOPHILS # BLD: 0.06 K/UL (ref 0–0.2)
BASOPHILS NFR BLD: 1 % (ref 0–2)
BILIRUB SERPL-MCNC: <0.2 MG/DL (ref 0–1.2)
BILIRUB UR QL STRIP: NEGATIVE
BUN SERPL-MCNC: 10 MG/DL (ref 6–20)
CALCIUM SERPL-MCNC: 9.4 MG/DL (ref 8.6–10.2)
CHLORIDE SERPL-SCNC: 105 MMOL/L (ref 98–107)
CLARITY UR: CLEAR
CO2 SERPL-SCNC: 28 MMOL/L (ref 22–29)
COLOR UR: YELLOW
CREAT SERPL-MCNC: 0.8 MG/DL (ref 0.5–1)
EOSINOPHIL # BLD: 0.42 K/UL (ref 0.05–0.5)
EOSINOPHILS RELATIVE PERCENT: 5 % (ref 0–6)
EPI CELLS #/AREA URNS HPF: ABNORMAL /HPF
ERYTHROCYTE [DISTWIDTH] IN BLOOD BY AUTOMATED COUNT: 12.9 % (ref 11.5–15)
GFR SERPL CREATININE-BSD FRML MDRD: >60 ML/MIN/1.73M2
GLUCOSE SERPL-MCNC: 125 MG/DL (ref 74–99)
GLUCOSE UR STRIP-MCNC: NEGATIVE MG/DL
HCT VFR BLD AUTO: 38.7 % (ref 34–48)
HGB BLD-MCNC: 13 G/DL (ref 11.5–15.5)
HGB UR QL STRIP.AUTO: NEGATIVE
IMM GRANULOCYTES # BLD AUTO: 0.05 K/UL (ref 0–0.58)
IMM GRANULOCYTES NFR BLD: 1 % (ref 0–5)
KETONES UR STRIP-MCNC: NEGATIVE MG/DL
LACTATE BLDV-SCNC: 1 MMOL/L (ref 0.5–2.2)
LEUKOCYTE ESTERASE UR QL STRIP: NEGATIVE
LIPASE SERPL-CCNC: 44 U/L (ref 13–60)
LYMPHOCYTES NFR BLD: 1.64 K/UL (ref 1.5–4)
LYMPHOCYTES RELATIVE PERCENT: 19 % (ref 20–42)
MCH RBC QN AUTO: 29.3 PG (ref 26–35)
MCHC RBC AUTO-ENTMCNC: 33.6 G/DL (ref 32–34.5)
MCV RBC AUTO: 87.2 FL (ref 80–99.9)
MONOCYTES NFR BLD: 0.46 K/UL (ref 0.1–0.95)
MONOCYTES NFR BLD: 5 % (ref 2–12)
NEUTROPHILS NFR BLD: 70 % (ref 43–80)
NEUTS SEG NFR BLD: 6.17 K/UL (ref 1.8–7.3)
NITRITE UR QL STRIP: NEGATIVE
PH UR STRIP: 6 [PH] (ref 5–9)
PLATELET # BLD AUTO: 213 K/UL (ref 130–450)
PMV BLD AUTO: 9.5 FL (ref 7–12)
POTASSIUM SERPL-SCNC: 3.6 MMOL/L (ref 3.5–5)
PROT SERPL-MCNC: 6.4 G/DL (ref 6.4–8.3)
PROT UR STRIP-MCNC: NEGATIVE MG/DL
RBC # BLD AUTO: 4.44 M/UL (ref 3.5–5.5)
RBC #/AREA URNS HPF: ABNORMAL /HPF
SODIUM SERPL-SCNC: 143 MMOL/L (ref 132–146)
SP GR UR STRIP: 1.02 (ref 1–1.03)
UROBILINOGEN UR STRIP-ACNC: 0.2 EU/DL (ref 0–1)
WBC #/AREA URNS HPF: ABNORMAL /HPF
WBC OTHER # BLD: 8.8 K/UL (ref 4.5–11.5)

## 2023-10-03 PROCEDURE — 81001 URINALYSIS AUTO W/SCOPE: CPT

## 2023-10-03 PROCEDURE — 99284 EMERGENCY DEPT VISIT MOD MDM: CPT

## 2023-10-03 PROCEDURE — 74176 CT ABD & PELVIS W/O CONTRAST: CPT

## 2023-10-03 PROCEDURE — 6360000002 HC RX W HCPCS: Performed by: EMERGENCY MEDICINE

## 2023-10-03 PROCEDURE — 83690 ASSAY OF LIPASE: CPT

## 2023-10-03 PROCEDURE — 80053 COMPREHEN METABOLIC PANEL: CPT

## 2023-10-03 PROCEDURE — 83605 ASSAY OF LACTIC ACID: CPT

## 2023-10-03 PROCEDURE — 96374 THER/PROPH/DIAG INJ IV PUSH: CPT

## 2023-10-03 PROCEDURE — 87086 URINE CULTURE/COLONY COUNT: CPT

## 2023-10-03 PROCEDURE — 85025 COMPLETE CBC W/AUTO DIFF WBC: CPT

## 2023-10-03 PROCEDURE — 2580000003 HC RX 258: Performed by: NURSE PRACTITIONER

## 2023-10-03 RX ORDER — NAPROXEN 500 MG/1
500 TABLET ORAL 2 TIMES DAILY
Qty: 14 TABLET | Refills: 0 | Status: SHIPPED | OUTPATIENT
Start: 2023-10-03 | End: 2023-10-10

## 2023-10-03 RX ORDER — ORPHENADRINE CITRATE 100 MG/1
100 TABLET, EXTENDED RELEASE ORAL 2 TIMES DAILY
Qty: 20 TABLET | Refills: 0 | Status: SHIPPED | OUTPATIENT
Start: 2023-10-03 | End: 2023-10-13

## 2023-10-03 RX ORDER — 0.9 % SODIUM CHLORIDE 0.9 %
1000 INTRAVENOUS SOLUTION INTRAVENOUS ONCE
Status: COMPLETED | OUTPATIENT
Start: 2023-10-03 | End: 2023-10-03

## 2023-10-03 RX ORDER — KETOROLAC TROMETHAMINE 30 MG/ML
15 INJECTION, SOLUTION INTRAMUSCULAR; INTRAVENOUS ONCE
Status: COMPLETED | OUTPATIENT
Start: 2023-10-03 | End: 2023-10-03

## 2023-10-03 RX ADMIN — KETOROLAC TROMETHAMINE 15 MG: 30 INJECTION, SOLUTION INTRAMUSCULAR; INTRAVENOUS at 23:38

## 2023-10-03 RX ADMIN — SODIUM CHLORIDE 1000 ML: 9 INJECTION, SOLUTION INTRAVENOUS at 22:59

## 2023-10-03 ASSESSMENT — PAIN DESCRIPTION - LOCATION
LOCATION: BACK
LOCATION: FLANK

## 2023-10-03 ASSESSMENT — PAIN DESCRIPTION - DESCRIPTORS
DESCRIPTORS: TENDER;SORE;DISCOMFORT;ACHING
DESCRIPTORS: TIGHTNESS

## 2023-10-03 ASSESSMENT — PAIN DESCRIPTION - ONSET: ONSET: ON-GOING

## 2023-10-03 ASSESSMENT — PAIN DESCRIPTION - PAIN TYPE: TYPE: ACUTE PAIN

## 2023-10-03 ASSESSMENT — PAIN DESCRIPTION - ORIENTATION
ORIENTATION: LOWER
ORIENTATION: RIGHT

## 2023-10-03 ASSESSMENT — PATIENT HEALTH QUESTIONNAIRE - PHQ9
2. FEELING DOWN, DEPRESSED OR HOPELESS: 0
SUM OF ALL RESPONSES TO PHQ9 QUESTIONS 1 & 2: 0
1. LITTLE INTEREST OR PLEASURE IN DOING THINGS: 0
SUM OF ALL RESPONSES TO PHQ QUESTIONS 1-9: 0

## 2023-10-03 ASSESSMENT — PAIN DESCRIPTION - FREQUENCY: FREQUENCY: CONTINUOUS

## 2023-10-03 ASSESSMENT — LIFESTYLE VARIABLES
HOW OFTEN DO YOU HAVE A DRINK CONTAINING ALCOHOL: MONTHLY OR LESS
HOW MANY STANDARD DRINKS CONTAINING ALCOHOL DO YOU HAVE ON A TYPICAL DAY: 1 OR 2

## 2023-10-03 ASSESSMENT — PAIN - FUNCTIONAL ASSESSMENT
PAIN_FUNCTIONAL_ASSESSMENT: 0-10
PAIN_FUNCTIONAL_ASSESSMENT: PREVENTS OR INTERFERES WITH ALL ACTIVE AND SOME PASSIVE ACTIVITIES

## 2023-10-03 ASSESSMENT — PAIN SCALES - GENERAL
PAINLEVEL_OUTOF10: 10
PAINLEVEL_OUTOF10: 9

## 2023-10-04 NOTE — DISCHARGE INSTRUCTIONS
Acute chest pain or shortness of breath any vomiting any numbness in your arms or legs any leg swelling or calf pain or any chest pain or shortness of breath

## 2023-10-05 LAB
MICROORGANISM SPEC CULT: ABNORMAL
SPECIMEN DESCRIPTION: ABNORMAL

## 2023-12-28 DIAGNOSIS — M54.50 CHRONIC BILATERAL LOW BACK PAIN WITHOUT SCIATICA: ICD-10-CM

## 2023-12-28 DIAGNOSIS — G89.29 CHRONIC BILATERAL LOW BACK PAIN WITHOUT SCIATICA: ICD-10-CM

## 2023-12-28 RX ORDER — GABAPENTIN 400 MG/1
400 CAPSULE ORAL 3 TIMES DAILY
Qty: 90 CAPSULE | Refills: 0 | OUTPATIENT
Start: 2023-12-28 | End: 2024-01-27

## 2023-12-28 NOTE — TELEPHONE ENCOUNTER
Last Appointment:  7/6/2023  Future Appointments   Date Time Provider Department Center   1/2/2024  9:30 AM URGENT CARE 1 [facility]   3/4/2024  2:30 PM Tulio Haque MD [facility]

## 2023-12-28 NOTE — TELEPHONE ENCOUNTER
Patient was last seen July. Plan at that time was referral to Pain Management and ONE TIME fill of gabapentin which was done by Dr. Haque. PDMP reviewed - patient last received gabapentin from a provider named Rasta Whitehead from [place] 12/22/23. This provider has been refilling gabapentin since July 25, 2023. We will not refill gabapentin. Please follow up if patient was seen by Pain Management (referral placed in July). Please also advice patient that she cannot be getting refills of this medication from multiple sources.

## 2024-02-25 ENCOUNTER — APPOINTMENT (OUTPATIENT)
Dept: GENERAL RADIOLOGY | Age: 54
End: 2024-02-25
Payer: COMMERCIAL

## 2024-02-25 ENCOUNTER — APPOINTMENT (OUTPATIENT)
Dept: CT IMAGING | Age: 54
End: 2024-02-25
Payer: COMMERCIAL

## 2024-02-25 ENCOUNTER — HOSPITAL ENCOUNTER (EMERGENCY)
Age: 54
Discharge: HOME | End: 2024-02-25
Payer: COMMERCIAL

## 2024-02-25 VITALS
WEIGHT: 201 LBS | SYSTOLIC BLOOD PRESSURE: 169 MMHG | DIASTOLIC BLOOD PRESSURE: 144 MMHG | RESPIRATION RATE: 14 BRPM | HEART RATE: 79 BPM | BODY MASS INDEX: 36.99 KG/M2 | HEIGHT: 62 IN | TEMPERATURE: 98 F | OXYGEN SATURATION: 93 %

## 2024-02-25 DIAGNOSIS — S39.012A STRAIN OF LUMBAR REGION, INITIAL ENCOUNTER: Primary | ICD-10-CM

## 2024-02-25 DIAGNOSIS — M51.36 BULGING LUMBAR DISC: ICD-10-CM

## 2024-02-25 PROCEDURE — 72131 CT LUMBAR SPINE W/O DYE: CPT

## 2024-02-25 PROCEDURE — 96372 THER/PROPH/DIAG INJ SC/IM: CPT

## 2024-02-25 PROCEDURE — 51798 US URINE CAPACITY MEASURE: CPT

## 2024-02-25 PROCEDURE — 72170 X-RAY EXAM OF PELVIS: CPT

## 2024-02-25 PROCEDURE — 99284 EMERGENCY DEPT VISIT MOD MDM: CPT

## 2024-02-25 PROCEDURE — 6370000000 HC RX 637 (ALT 250 FOR IP): Performed by: NURSE PRACTITIONER

## 2024-02-25 PROCEDURE — 6360000002 HC RX W HCPCS: Performed by: NURSE PRACTITIONER

## 2024-02-25 RX ORDER — LIDOCAINE 4 G/G
1 PATCH TOPICAL DAILY
Qty: 30 PATCH | Refills: 0 | Status: SHIPPED | OUTPATIENT
Start: 2024-02-25 | End: 2024-03-26

## 2024-02-25 RX ORDER — DEXAMETHASONE SODIUM PHOSPHATE 10 MG/ML
8 INJECTION INTRAMUSCULAR; INTRAVENOUS ONCE
Status: DISCONTINUED | OUTPATIENT
Start: 2024-02-25 | End: 2024-02-25

## 2024-02-25 RX ORDER — MORPHINE SULFATE 4 MG/ML
4 INJECTION, SOLUTION INTRAMUSCULAR; INTRAVENOUS ONCE
Status: COMPLETED | OUTPATIENT
Start: 2024-02-25 | End: 2024-02-25

## 2024-02-25 RX ORDER — PREDNISONE 10 MG/1
40 TABLET ORAL DAILY
Qty: 20 TABLET | Refills: 0 | Status: SHIPPED | OUTPATIENT
Start: 2024-02-25 | End: 2024-02-29

## 2024-02-25 RX ORDER — KETOROLAC TROMETHAMINE 30 MG/ML
30 INJECTION, SOLUTION INTRAMUSCULAR; INTRAVENOUS ONCE
Status: COMPLETED | OUTPATIENT
Start: 2024-02-25 | End: 2024-02-25

## 2024-02-25 RX ORDER — DEXAMETHASONE SODIUM PHOSPHATE 10 MG/ML
8 INJECTION INTRAMUSCULAR; INTRAVENOUS ONCE
Status: COMPLETED | OUTPATIENT
Start: 2024-02-25 | End: 2024-02-25

## 2024-02-25 RX ORDER — ORPHENADRINE CITRATE 30 MG/ML
60 INJECTION INTRAMUSCULAR; INTRAVENOUS ONCE
Status: COMPLETED | OUTPATIENT
Start: 2024-02-25 | End: 2024-02-25

## 2024-02-25 RX ORDER — ACETAMINOPHEN 500 MG
1000 TABLET ORAL ONCE
Status: COMPLETED | OUTPATIENT
Start: 2024-02-25 | End: 2024-02-25

## 2024-02-25 RX ORDER — HYDROCODONE BITARTRATE AND ACETAMINOPHEN 5; 325 MG/1; MG/1
1 TABLET ORAL EVERY 6 HOURS PRN
Qty: 12 TABLET | Refills: 0 | Status: SHIPPED | OUTPATIENT
Start: 2024-02-25 | End: 2024-02-28

## 2024-02-25 RX ADMIN — DEXAMETHASONE SODIUM PHOSPHATE 8 MG: 10 INJECTION INTRAMUSCULAR; INTRAVENOUS at 13:12

## 2024-02-25 RX ADMIN — ORPHENADRINE CITRATE 60 MG: 60 INJECTION INTRAMUSCULAR; INTRAVENOUS at 13:12

## 2024-02-25 RX ADMIN — KETOROLAC TROMETHAMINE 30 MG: 30 INJECTION INTRAMUSCULAR; INTRAVENOUS at 13:12

## 2024-02-25 RX ADMIN — ACETAMINOPHEN 1000 MG: 500 TABLET ORAL at 13:12

## 2024-02-25 RX ADMIN — MORPHINE SULFATE 4 MG: 4 INJECTION, SOLUTION INTRAMUSCULAR; INTRAVENOUS at 14:19

## 2024-02-25 ASSESSMENT — PAIN DESCRIPTION - DESCRIPTORS: DESCRIPTORS: SHARP

## 2024-02-25 ASSESSMENT — PAIN SCALES - GENERAL
PAINLEVEL_OUTOF10: 7

## 2024-02-25 ASSESSMENT — PAIN DESCRIPTION - LOCATION
LOCATION: BACK

## 2024-02-25 ASSESSMENT — PAIN - FUNCTIONAL ASSESSMENT: PAIN_FUNCTIONAL_ASSESSMENT: 0-10

## 2024-02-25 ASSESSMENT — PAIN DESCRIPTION - ORIENTATION: ORIENTATION: MID

## 2024-02-25 NOTE — ED PROVIDER NOTES
Independent NEL Visit.      [facility]  Department of Emergency Medicine   ED  Encounter Note  Admit Date/RoomTime: 2024 12:52 PM  ED Room:     NAME: Lesley Dumont  : 1970  MRN: [id number]     Chief Complaint:  Back Pain (Since sleeping in chair 1 wk ago), Hip Pain (bilateral), Numbness (Between legs ), and Incontinence (States she urinated on herself last wk)    History of Present Illness       Lesley Dumont is a 53 y.o. old female with a prior history of no prior back problems, presents to the emergency department by private vehicle, for low back pain that has been worsening over the last week.  She states she was visiting a patient at a hospital and has been sleeping in a recliner chair.  Since then, the pain started and has been worsening.    She states at times, the pain radiates down the back of her legs to her knees.  No prior back injuries or surgeries.  She has been ambulating since the pain started.    She states that last week, while she was asleep, she urinated herself, however, she has not had any episodes of incontinence while awake.    She reports her back has been stiff in the mornings and that is when it is most painful.   ROS   Pertinent positives and negatives are stated within HPI, all other systems reviewed and are negative.    Past Medical History:  has a past medical history of Asthma, Cellulitis, Diverticulitis, Diverticulosis, HTN (hypertension), and Hyperlipidemia.    Surgical History:  has a past surgical history that includes Tooth Extraction (); lymph node dissection (); and Tonsillectomy.    Social History:  reports that she has been smoking cigarettes. She started smoking about 19 years ago. She has a 5.0 pack-year smoking history. She has never used smokeless tobacco. She reports current alcohol use. She reports that she does not currently use drugs after having used the following drugs: Cocaine.    Family History: family

## 2024-02-25 NOTE — DISCHARGE INSTRUCTIONS
LIDOCAINE PATCHES TO LOW  BACK AS NEEDED.  ALTERNATE ICE AND HEAT WHEN NOT USING LIDOCAINE PATCHES.  NORCO AS NEEDED FOR SEVERE PAIN ONLY.  ALTERNATE TYLENOL AND IBUPROFEN  FOLLOW UP WITH DR. MCGOWAN, CALL HIS OFFICE TOMORROW.  RETURN FOR WORSENING SYMPTOMS.

## 2024-02-29 ENCOUNTER — HOSPITAL ENCOUNTER (EMERGENCY)
Age: 54
Discharge: HOME | End: 2024-02-29
Attending: EMERGENCY MEDICINE
Payer: COMMERCIAL

## 2024-02-29 VITALS
HEART RATE: 70 BPM | TEMPERATURE: 98.1 F | RESPIRATION RATE: 16 BRPM | SYSTOLIC BLOOD PRESSURE: 153 MMHG | DIASTOLIC BLOOD PRESSURE: 87 MMHG | OXYGEN SATURATION: 96 %

## 2024-02-29 DIAGNOSIS — M54.41 ACUTE BILATERAL LOW BACK PAIN WITH BILATERAL SCIATICA: Primary | ICD-10-CM

## 2024-02-29 DIAGNOSIS — M54.42 ACUTE BILATERAL LOW BACK PAIN WITH BILATERAL SCIATICA: Primary | ICD-10-CM

## 2024-02-29 DIAGNOSIS — M51.26 LUMBAR HERNIATED DISC: ICD-10-CM

## 2024-02-29 DIAGNOSIS — Z76.0 ENCOUNTER FOR MEDICATION REFILL: ICD-10-CM

## 2024-02-29 LAB
ALBUMIN SERPL-MCNC: 3.9 G/DL (ref 3.5–5.2)
ALP SERPL-CCNC: 69 U/L (ref 35–104)
ALT SERPL-CCNC: 19 U/L (ref 0–32)
ANION GAP SERPL CALCULATED.3IONS-SCNC: 13 MMOL/L (ref 7–16)
AST SERPL-CCNC: 14 U/L (ref 0–31)
BASOPHILS # BLD: 0.05 K/UL (ref 0–0.2)
BASOPHILS NFR BLD: 0 % (ref 0–2)
BILIRUB SERPL-MCNC: 0.2 MG/DL (ref 0–1.2)
BUN SERPL-MCNC: 11 MG/DL (ref 6–20)
CALCIUM SERPL-MCNC: 9.3 MG/DL (ref 8.6–10.2)
CHLORIDE SERPL-SCNC: 101 MMOL/L (ref 98–107)
CO2 SERPL-SCNC: 27 MMOL/L (ref 22–29)
CREAT SERPL-MCNC: 0.7 MG/DL (ref 0.5–1)
EOSINOPHIL # BLD: 0.25 K/UL (ref 0.05–0.5)
EOSINOPHILS RELATIVE PERCENT: 2 % (ref 0–6)
ERYTHROCYTE [DISTWIDTH] IN BLOOD BY AUTOMATED COUNT: 13 % (ref 11.5–15)
ERYTHROCYTE [SEDIMENTATION RATE] IN BLOOD BY WESTERGREN METHOD: 1 MM/HR (ref 0–20)
GFR SERPL CREATININE-BSD FRML MDRD: >60 ML/MIN/1.73M2
GLUCOSE SERPL-MCNC: 143 MG/DL (ref 74–99)
HCT VFR BLD AUTO: 44.7 % (ref 34–48)
HGB BLD-MCNC: 15.1 G/DL (ref 11.5–15.5)
IMM GRANULOCYTES # BLD AUTO: 0.09 K/UL (ref 0–0.58)
IMM GRANULOCYTES NFR BLD: 1 % (ref 0–5)
LYMPHOCYTES NFR BLD: 2.4 K/UL (ref 1.5–4)
LYMPHOCYTES RELATIVE PERCENT: 19 % (ref 20–42)
MCH RBC QN AUTO: 28.3 PG (ref 26–35)
MCHC RBC AUTO-ENTMCNC: 33.8 G/DL (ref 32–34.5)
MCV RBC AUTO: 83.9 FL (ref 80–99.9)
MONOCYTES NFR BLD: 0.65 K/UL (ref 0.1–0.95)
MONOCYTES NFR BLD: 5 % (ref 2–12)
NEUTROPHILS NFR BLD: 72 % (ref 43–80)
NEUTS SEG NFR BLD: 8.92 K/UL (ref 1.8–7.3)
PLATELET # BLD AUTO: 284 K/UL (ref 130–450)
PMV BLD AUTO: 9.7 FL (ref 7–12)
POTASSIUM SERPL-SCNC: 3.5 MMOL/L (ref 3.5–5)
PROT SERPL-MCNC: 6.4 G/DL (ref 6.4–8.3)
RBC # BLD AUTO: 5.33 M/UL (ref 3.5–5.5)
SODIUM SERPL-SCNC: 141 MMOL/L (ref 132–146)
WBC OTHER # BLD: 12.4 K/UL (ref 4.5–11.5)

## 2024-02-29 PROCEDURE — 96375 TX/PRO/DX INJ NEW DRUG ADDON: CPT

## 2024-02-29 PROCEDURE — 99284 EMERGENCY DEPT VISIT MOD MDM: CPT

## 2024-02-29 PROCEDURE — 2580000003 HC RX 258: Performed by: EMERGENCY MEDICINE

## 2024-02-29 PROCEDURE — 96374 THER/PROPH/DIAG INJ IV PUSH: CPT

## 2024-02-29 PROCEDURE — 80053 COMPREHEN METABOLIC PANEL: CPT

## 2024-02-29 PROCEDURE — 6370000000 HC RX 637 (ALT 250 FOR IP): Performed by: EMERGENCY MEDICINE

## 2024-02-29 PROCEDURE — 6360000002 HC RX W HCPCS: Performed by: EMERGENCY MEDICINE

## 2024-02-29 PROCEDURE — 96372 THER/PROPH/DIAG INJ SC/IM: CPT

## 2024-02-29 PROCEDURE — 85652 RBC SED RATE AUTOMATED: CPT

## 2024-02-29 PROCEDURE — 85025 COMPLETE CBC W/AUTO DIFF WBC: CPT

## 2024-02-29 RX ORDER — VENLAFAXINE HYDROCHLORIDE 75 MG/1
75 CAPSULE, EXTENDED RELEASE ORAL DAILY
Qty: 14 CAPSULE | Refills: 0 | Status: SHIPPED | OUTPATIENT
Start: 2024-02-29 | End: 2024-03-14

## 2024-02-29 RX ORDER — NAPROXEN 500 MG/1
500 TABLET ORAL ONCE
Status: COMPLETED | OUTPATIENT
Start: 2024-02-29 | End: 2024-02-29

## 2024-02-29 RX ORDER — HYDROCODONE BITARTRATE AND ACETAMINOPHEN 5; 325 MG/1; MG/1
1 TABLET ORAL EVERY 6 HOURS PRN
Qty: 20 TABLET | Refills: 0 | Status: SHIPPED | OUTPATIENT
Start: 2024-02-29 | End: 2024-03-05

## 2024-02-29 RX ORDER — DEXAMETHASONE SODIUM PHOSPHATE 10 MG/ML
10 INJECTION INTRAMUSCULAR; INTRAVENOUS ONCE
Status: COMPLETED | OUTPATIENT
Start: 2024-02-29 | End: 2024-02-29

## 2024-02-29 RX ORDER — ORPHENADRINE CITRATE 100 MG/1
100 TABLET, EXTENDED RELEASE ORAL 2 TIMES DAILY PRN
Qty: 28 TABLET | Refills: 0 | Status: SHIPPED | OUTPATIENT
Start: 2024-02-29 | End: 2024-03-14

## 2024-02-29 RX ORDER — NAPROXEN 500 MG/1
500 TABLET ORAL 2 TIMES DAILY PRN
Qty: 28 TABLET | Refills: 0 | Status: SHIPPED | OUTPATIENT
Start: 2024-02-29 | End: 2024-03-14

## 2024-02-29 RX ORDER — DULOXETIN HYDROCHLORIDE 60 MG/1
60 CAPSULE, DELAYED RELEASE ORAL DAILY
Qty: 14 CAPSULE | Refills: 0 | Status: SHIPPED | OUTPATIENT
Start: 2024-02-29 | End: 2024-03-14

## 2024-02-29 RX ORDER — METHYLPREDNISOLONE 4 MG/1
TABLET ORAL
Qty: 1 KIT | Refills: 0 | Status: SHIPPED | OUTPATIENT
Start: 2024-02-29 | End: 2024-03-06

## 2024-02-29 RX ORDER — FENTANYL CITRATE 50 UG/ML
50 INJECTION, SOLUTION INTRAMUSCULAR; INTRAVENOUS ONCE
Status: COMPLETED | OUTPATIENT
Start: 2024-02-29 | End: 2024-02-29

## 2024-02-29 RX ORDER — ORPHENADRINE CITRATE 30 MG/ML
60 INJECTION INTRAMUSCULAR; INTRAVENOUS ONCE
Status: COMPLETED | OUTPATIENT
Start: 2024-02-29 | End: 2024-02-29

## 2024-02-29 RX ORDER — 0.9 % SODIUM CHLORIDE 0.9 %
1000 INTRAVENOUS SOLUTION INTRAVENOUS ONCE
Status: COMPLETED | OUTPATIENT
Start: 2024-02-29 | End: 2024-02-29

## 2024-02-29 RX ADMIN — SODIUM CHLORIDE 1000 ML: 9 INJECTION, SOLUTION INTRAVENOUS at 15:18

## 2024-02-29 RX ADMIN — ORPHENADRINE CITRATE 60 MG: 60 INJECTION INTRAMUSCULAR; INTRAVENOUS at 15:17

## 2024-02-29 RX ADMIN — NAPROXEN 500 MG: 500 TABLET ORAL at 15:17

## 2024-02-29 RX ADMIN — FENTANYL CITRATE 50 MCG: 50 INJECTION INTRAMUSCULAR; INTRAVENOUS at 15:17

## 2024-02-29 RX ADMIN — DEXAMETHASONE SODIUM PHOSPHATE 10 MG: 10 INJECTION INTRAMUSCULAR; INTRAVENOUS at 15:17

## 2024-02-29 ASSESSMENT — PAIN DESCRIPTION - ORIENTATION: ORIENTATION: LOWER

## 2024-02-29 ASSESSMENT — PAIN SCALES - GENERAL: PAINLEVEL_OUTOF10: 8

## 2024-02-29 ASSESSMENT — PAIN DESCRIPTION - LOCATION: LOCATION: BACK

## 2024-02-29 ASSESSMENT — PAIN - FUNCTIONAL ASSESSMENT: PAIN_FUNCTIONAL_ASSESSMENT: 0-10

## 2024-02-29 NOTE — ED PROVIDER NOTES
risk of disposition/plan, as well as the risk of uncertainty in estimating outcomes. [id number]          Shared decision making was used to determine testing, treatments and disposition.      Re-Evaluations:   Re-evaluation.  Patient’s symptoms are improving  Repeat physical examination is improved      CONSULTS: (Who and What was discussed)  none      Counseling:  The emergency provider has spoken with the patient and discussed today’s results, in addition to providing specific details for the plan of care and counseling regarding the diagnosis and prognosis.  Questions are answered at this time and they are agreeable with the plan.    I am the Primary Clinician of Record.     --------------------------------- IMPRESSION AND DISPOSITION ---------------------------------    IMPRESSION  1. Acute bilateral low back pain with bilateral sciatica    2. Encounter for medication refill    3. Lumbar herniated disc        DISPOSITION  Disposition: Discharge to home  Patient condition is stable    PATIENT REFERRED TO:  Dr Whitehead    Go to   as scheduled March 5th    Vlad Nelson MD  [address] [facility] [address]  [phone]    Go to   as scheduled March 11th    [facility]  [address] [place] [address]  [phone]  Go to   If symptoms worsen      DISCHARGE MEDICATIONS:  Discharge Medication List as of 2/29/2024  5:13 PM        START taking these medications    Details   orphenadrine (NORFLEX) 100 MG extended release tablet Take 1 tablet by mouth 2 times daily as needed for Muscle spasms, Disp-28 tablet, R-0Print      methylPREDNISolone (MEDROL, KRUPA,) 4 MG tablet Take by mouth., Disp-1 kit, R-0Print      HYDROcodone-acetaminophen (NORCO) 5-325 MG per tablet Take 1 tablet by mouth every 6 hours as needed for Pain for up to 5 days. Intended supply: 3 days. Take lowest dose possible to manage pain Max Daily Amount: 4 tablets, Disp-20 tablet,

## 2024-03-06 NOTE — PROGRESS NOTES
[facility] Internal Medicine      SUBJECTIVE:  Lesley Dumont (:  1970) is a 53 y.o. female here for evaluation of the following chief complaint(s):  Loss of Consciousness (Pt states she passed out and  fell down the stairs. Pt states she hit her head and upper back.) and Back Pain  Patient presented to the internal medicine clinic for regular follow-up visit.  Patient was seen by me in the clinic on 2023 and has not been following since then.  On chart review, patient has had 4 ED visits since the last clinic encounter mostly for back and flank pain.  Patient has been getting management with pain medications including opiate and tramadol.  She was recently evaluated with CT lumbar spine which showed disc bulging, ligamentum flavum hypertrophy, moderate central canal stenosis, left paracentral disc protrusion at L5/S1 level causing left neuroforaminal narrowing.  Patient was referred to neurosurgery and will need MRI of the lumbar spine.    On presentation to the clinic today patient was complaining of back pain.  She was requesting for pain management for the back pain with tramadol or other opioids.  Patient was counseled in detail and was advised to follow-up with pain management, referral placed which he agreed upon.  All chronic medications were refilled and gabapentin dose was decreased as she was not taking gabapentin since 1 month.  UDS done on the last ED visit was positive for cocaine which patient stated she was free for about 9 months and started taking cocaine recently because she had some new stressors in life.  Patient smokes about 5 to 6 cigarettes/day and she denied of any alcohol use.  Patient was advised to stop doing cocaine and smoking which she stated she will be thinking about.  According to her she also visits to her previous PCP in [place] for some medications.  Records to be obtained from the other provider Clinic visit.    Review of

## 2024-03-07 ENCOUNTER — OFFICE VISIT (OUTPATIENT)
Dept: INTERNAL MEDICINE | Age: 54
End: 2024-03-07
Payer: COMMERCIAL

## 2024-03-07 VITALS
HEART RATE: 68 BPM | TEMPERATURE: 97.2 F | RESPIRATION RATE: 18 BRPM | HEIGHT: 62 IN | BODY MASS INDEX: 39.67 KG/M2 | WEIGHT: 215.6 LBS | SYSTOLIC BLOOD PRESSURE: 143 MMHG | DIASTOLIC BLOOD PRESSURE: 89 MMHG | OXYGEN SATURATION: 95 %

## 2024-03-07 DIAGNOSIS — I10 PRIMARY HYPERTENSION: Chronic | ICD-10-CM

## 2024-03-07 DIAGNOSIS — E78.49 OTHER HYPERLIPIDEMIA: ICD-10-CM

## 2024-03-07 DIAGNOSIS — M54.50 CHRONIC BILATERAL LOW BACK PAIN WITHOUT SCIATICA: ICD-10-CM

## 2024-03-07 DIAGNOSIS — F14.10 COCAINE ABUSE (HCC): ICD-10-CM

## 2024-03-07 DIAGNOSIS — F32.A DEPRESSION, UNSPECIFIED DEPRESSION TYPE: ICD-10-CM

## 2024-03-07 DIAGNOSIS — E11.69 TYPE 2 DIABETES MELLITUS WITH OTHER SPECIFIED COMPLICATION, WITHOUT LONG-TERM CURRENT USE OF INSULIN (HCC): Primary | ICD-10-CM

## 2024-03-07 DIAGNOSIS — G89.29 CHRONIC BILATERAL LOW BACK PAIN WITHOUT SCIATICA: ICD-10-CM

## 2024-03-07 PROCEDURE — G8484 FLU IMMUNIZE NO ADMIN: HCPCS

## 2024-03-07 PROCEDURE — 4004F PT TOBACCO SCREEN RCVD TLK: CPT

## 2024-03-07 PROCEDURE — 3077F SYST BP >= 140 MM HG: CPT

## 2024-03-07 PROCEDURE — 2022F DILAT RTA XM EVC RTNOPTHY: CPT

## 2024-03-07 PROCEDURE — G8427 DOCREV CUR MEDS BY ELIG CLIN: HCPCS

## 2024-03-07 PROCEDURE — 99213 OFFICE O/P EST LOW 20 MIN: CPT

## 2024-03-07 PROCEDURE — G8417 CALC BMI ABV UP PARAM F/U: HCPCS

## 2024-03-07 PROCEDURE — 3046F HEMOGLOBIN A1C LEVEL >9.0%: CPT

## 2024-03-07 PROCEDURE — 3017F COLORECTAL CA SCREEN DOC REV: CPT

## 2024-03-07 PROCEDURE — 99214 OFFICE O/P EST MOD 30 MIN: CPT

## 2024-03-07 PROCEDURE — 3078F DIAST BP <80 MM HG: CPT

## 2024-03-07 RX ORDER — ATORVASTATIN CALCIUM 40 MG/1
40 TABLET, FILM COATED ORAL NIGHTLY
Qty: 30 TABLET | Refills: 2 | Status: SHIPPED | OUTPATIENT
Start: 2024-03-07

## 2024-03-07 RX ORDER — GABAPENTIN 300 MG/1
300 CAPSULE ORAL 3 TIMES DAILY
Qty: 90 CAPSULE | Refills: 1 | Status: SHIPPED | OUTPATIENT
Start: 2024-03-07 | End: 2024-05-06

## 2024-03-07 RX ORDER — HYDROCHLOROTHIAZIDE 12.5 MG/1
12.5 TABLET ORAL DAILY
Qty: 90 TABLET | Refills: 1 | Status: SHIPPED | OUTPATIENT
Start: 2024-03-07

## 2024-03-07 RX ORDER — VENLAFAXINE HYDROCHLORIDE 75 MG/1
75 CAPSULE, EXTENDED RELEASE ORAL DAILY
Qty: 10 CAPSULE | Refills: 0 | Status: SHIPPED | OUTPATIENT
Start: 2024-03-07 | End: 2024-03-21

## 2024-03-07 RX ORDER — TRAMADOL HYDROCHLORIDE 50 MG/1
50 TABLET ORAL 2 TIMES DAILY PRN
COMMUNITY
Start: 2023-08-30 | End: 2024-03-07 | Stop reason: ALTCHOICE

## 2024-03-07 RX ORDER — SUMATRIPTAN 100 MG/1
100 TABLET, FILM COATED ORAL
COMMUNITY

## 2024-03-07 RX ORDER — QUETIAPINE FUMARATE 25 MG/1
25 TABLET, FILM COATED ORAL 2 TIMES DAILY
COMMUNITY
Start: 2023-12-06

## 2024-03-07 RX ORDER — LIDOCAINE 4 G/G
1 PATCH TOPICAL DAILY
Qty: 30 PATCH | Refills: 0 | Status: CANCELLED | OUTPATIENT
Start: 2024-03-07 | End: 2024-04-06

## 2024-03-07 RX ORDER — DULOXETIN HYDROCHLORIDE 60 MG/1
60 CAPSULE, DELAYED RELEASE ORAL DAILY
Qty: 14 CAPSULE | Refills: 0 | Status: SHIPPED | OUTPATIENT
Start: 2024-03-07 | End: 2024-03-21

## 2024-03-07 RX ORDER — NAPROXEN 500 MG/1
500 TABLET ORAL 2 TIMES DAILY PRN
Qty: 14 TABLET | Refills: 0 | Status: SHIPPED | OUTPATIENT
Start: 2024-03-07 | End: 2024-03-14

## 2024-03-07 RX ORDER — TRAMADOL HYDROCHLORIDE 50 MG/1
50 TABLET ORAL 2 TIMES DAILY PRN
Status: CANCELLED | OUTPATIENT
Start: 2024-03-07

## 2024-03-07 ASSESSMENT — ENCOUNTER SYMPTOMS
BACK PAIN: 1
RHINORRHEA: 0
NAUSEA: 0
VOMITING: 0
VOICE CHANGE: 0
COUGH: 0
SORE THROAT: 0
CHEST TIGHTNESS: 0
ABDOMINAL PAIN: 0
SHORTNESS OF BREATH: 0

## 2024-03-07 NOTE — PATIENT INSTRUCTIONS
Thank you for coming to your follow up appointment   Please take your medications as directed and keep your follow up appointment in 6/20/2024  .  Call our office if you have any questions or concerns at [phone]  Have blood work done prior to next appointment.    Tulio Haque MD

## 2024-03-07 NOTE — PROGRESS NOTES
[facility]  Internal Medicine Residency Clinic    Attending Physician Statement  I have discussed the case, including pertinent history and exam findings with the resident physician.I have seen and examined the patient and the key elements of the encounter have been performed by me. I agree with the assessment, plan and orders as documented by the resident. I have reviewed all pertinent PMHx, PSHx, FamHx, SocialHx, medications, and allergies and updated history as appropriate.    Patient here for routine follow up of medical problems. Has Hx chronic back pain, HTN, cocaine abuse, DM, neuropathy, hyperlipidemia and depression, sees psychiatry. Patient has not been following up consistently, and I emphasized the need for med compliance and regular follow up. Patient states she really has been trying to follow a better diet and has not gain weight. Agree with labs, urine microalbumin, and consider ACEi/ARB.      Remainder of medical problems as per resident note.    Viral Nieto, DO  3/7/24

## 2024-03-25 ENCOUNTER — OFFICE VISIT (OUTPATIENT)
Dept: NEUROSURGERY | Age: 54
End: 2024-03-25
Payer: COMMERCIAL

## 2024-03-25 DIAGNOSIS — G89.29 CHRONIC BILATERAL LOW BACK PAIN WITH BILATERAL SCIATICA: Primary | ICD-10-CM

## 2024-03-25 DIAGNOSIS — M54.42 CHRONIC BILATERAL LOW BACK PAIN WITH BILATERAL SCIATICA: Primary | ICD-10-CM

## 2024-03-25 DIAGNOSIS — G89.29 CHRONIC BILATERAL LOW BACK PAIN WITHOUT SCIATICA: ICD-10-CM

## 2024-03-25 DIAGNOSIS — M54.50 CHRONIC BILATERAL LOW BACK PAIN WITHOUT SCIATICA: ICD-10-CM

## 2024-03-25 DIAGNOSIS — M54.41 CHRONIC BILATERAL LOW BACK PAIN WITH BILATERAL SCIATICA: Primary | ICD-10-CM

## 2024-03-25 PROCEDURE — 99202 OFFICE O/P NEW SF 15 MIN: CPT

## 2024-03-25 PROCEDURE — 99203 OFFICE O/P NEW LOW 30 MIN: CPT | Performed by: PHYSICIAN ASSISTANT

## 2024-03-25 RX ORDER — METHOCARBAMOL 750 MG/1
750 TABLET, FILM COATED ORAL 4 TIMES DAILY
Qty: 40 TABLET | Refills: 0 | Status: SHIPPED | OUTPATIENT
Start: 2024-03-25 | End: 2024-04-04

## 2024-03-25 RX ORDER — GABAPENTIN 300 MG/1
300 CAPSULE ORAL 3 TIMES DAILY
Qty: 90 CAPSULE | Refills: 1 | Status: SHIPPED | OUTPATIENT
Start: 2024-03-25 | End: 2024-05-24

## 2024-03-25 ASSESSMENT — ENCOUNTER SYMPTOMS
PHOTOPHOBIA: 0
SHORTNESS OF BREATH: 0
ABDOMINAL PAIN: 0
BACK PAIN: 1
TROUBLE SWALLOWING: 0

## 2024-03-25 NOTE — PROGRESS NOTES
Subjective:      Patient ID: Lesley Dumont is a 53 y.o. female.    Lesley Dumont is a 53 year old female presenting for acute on chronic low back pain with radiation down her right leg. Describes the pain as sharp and stabbing. Admits to numbness in her right leg. Walking and using steps make the symptoms worse. She has tried ibuprofen, naproxen, gabapentin, lidocaine patches, steroids, chiropractor, and at home exercise program without significant lasting relief. Denies recent KAMILA. Denies loss of bowel or bladder, saddle anesthesia, headache, loss of dexterity, abnormal gait, fever, chills, N/V, SOB, or chest pain.    Of note, patient smokes 1/2 ppd.    CT lumbar spine 2/25/24: IMPRESSION:  1. Broad-based disc bulge, ligamentum flavum hypertrophy and facet  arthropathy at the L4-5 level.  The combination causes moderate central canal  stenosis  2. Left paracentral disc protrusion at the L5-S1 level causing left neural  foraminal narrowing.  3. Given the patient's symptoms and findings on the CT scan dedicated  follow-up nonemergent MRI of the lumbar spine is recommended.    Review of Systems   Constitutional:  Negative for fever and unexpected weight change.   HENT:  Negative for trouble swallowing.    Eyes:  Negative for photophobia and visual disturbance.   Respiratory:  Negative for shortness of breath.    Cardiovascular:  Negative for chest pain.   Gastrointestinal:  Negative for abdominal pain.   Endocrine: Negative for heat intolerance.   Genitourinary:  Negative for flank pain.   Musculoskeletal:  Positive for arthralgias, back pain and gait problem. Negative for myalgias and neck pain.   Skin:  Negative for wound.   Neurological:  Positive for numbness. Negative for weakness and headaches.   Psychiatric/Behavioral:  Negative for confusion.        Objective:   Physical Exam  Constitutional:       Appearance: Normal appearance. She is well-developed.   HENT:      Head: Normocephalic and atraumatic.

## 2024-03-26 ENCOUNTER — HOSPITAL ENCOUNTER (EMERGENCY)
Age: 54
Discharge: LEFT BEFORE DISCHARGE | End: 2024-03-27
Attending: STUDENT IN AN ORGANIZED HEALTH CARE EDUCATION/TRAINING PROGRAM
Payer: COMMERCIAL

## 2024-03-26 ENCOUNTER — APPOINTMENT (OUTPATIENT)
Dept: CT IMAGING | Age: 54
End: 2024-03-26
Payer: COMMERCIAL

## 2024-03-26 VITALS
RESPIRATION RATE: 18 BRPM | HEART RATE: 92 BPM | TEMPERATURE: 98.8 F | OXYGEN SATURATION: 95 % | DIASTOLIC BLOOD PRESSURE: 90 MMHG | SYSTOLIC BLOOD PRESSURE: 121 MMHG | BODY MASS INDEX: 39.01 KG/M2 | WEIGHT: 212 LBS | HEIGHT: 62 IN

## 2024-03-26 DIAGNOSIS — M54.50 ACUTE EXACERBATION OF CHRONIC LOW BACK PAIN: Primary | ICD-10-CM

## 2024-03-26 DIAGNOSIS — G89.29 ACUTE EXACERBATION OF CHRONIC LOW BACK PAIN: Primary | ICD-10-CM

## 2024-03-26 PROCEDURE — 99284 EMERGENCY DEPT VISIT MOD MDM: CPT

## 2024-03-26 PROCEDURE — 6370000000 HC RX 637 (ALT 250 FOR IP): Performed by: STUDENT IN AN ORGANIZED HEALTH CARE EDUCATION/TRAINING PROGRAM

## 2024-03-26 PROCEDURE — 96372 THER/PROPH/DIAG INJ SC/IM: CPT

## 2024-03-26 PROCEDURE — 6360000002 HC RX W HCPCS: Performed by: STUDENT IN AN ORGANIZED HEALTH CARE EDUCATION/TRAINING PROGRAM

## 2024-03-26 RX ORDER — ORPHENADRINE CITRATE 30 MG/ML
60 INJECTION INTRAMUSCULAR; INTRAVENOUS ONCE
Status: COMPLETED | OUTPATIENT
Start: 2024-03-26 | End: 2024-03-26

## 2024-03-26 RX ORDER — LIDOCAINE 4 G/G
1 PATCH TOPICAL ONCE
Status: DISCONTINUED | OUTPATIENT
Start: 2024-03-26 | End: 2024-03-27 | Stop reason: HOSPADM

## 2024-03-26 RX ORDER — DEXAMETHASONE SODIUM PHOSPHATE 10 MG/ML
10 INJECTION INTRAMUSCULAR; INTRAVENOUS ONCE
Status: COMPLETED | OUTPATIENT
Start: 2024-03-26 | End: 2024-03-26

## 2024-03-26 RX ADMIN — ORPHENADRINE CITRATE 60 MG: 60 INJECTION INTRAMUSCULAR; INTRAVENOUS at 21:46

## 2024-03-26 RX ADMIN — DEXAMETHASONE SODIUM PHOSPHATE 10 MG: 10 INJECTION INTRAMUSCULAR; INTRAVENOUS at 21:46

## 2024-03-26 ASSESSMENT — PAIN - FUNCTIONAL ASSESSMENT: PAIN_FUNCTIONAL_ASSESSMENT: 0-10

## 2024-03-26 ASSESSMENT — PAIN SCALES - GENERAL: PAINLEVEL_OUTOF10: 10

## 2024-03-26 ASSESSMENT — PAIN DESCRIPTION - LOCATION: LOCATION: BACK

## 2024-03-27 NOTE — ED PROVIDER NOTES
[facility] EMERGENCY DEPARTMENT  EMERGENCY DEPARTMENT ENCOUNTER        Pt Name: Lesley Dumont  MRN: [id number]  Birthdate 1970  Date of evaluation: 3/26/2024  Provider: Doris Brown DO  PCP: No primary care provider on file.  Note Started: 9:06 PM EDT 3/26/24    CHIEF COMPLAINT       Chief Complaint   Patient presents with    Back Pain     Lower back pain, radiates into legs. Recent imaging done here and seen at [facility],  seeing neurologist.  Dr. Nelson wants MRI, pain uncontrolled with meds that were prescribed.       HISTORY OF PRESENT ILLNESS: 1 or more Elements   History From: patient    Limitations to history : None    Lesley Dumont is a 53 y.o. female with a history of hypertension, hyperlipidemia, type 2 diabetes, chronic back pain presenting to the emergency department complaining of lower back pain.  Patient states that she has low back pain that radiates to her bilateral legs.  She states that she had imaging done a few weeks ago and she worked an 8-hour shift today and is still having pain.  She describes the pain as throbbing in nature and radiating down her right leg.  She states that she is following with neurosurgery, Dr. Nelson, and she actually saw a physicians assistant in the office there yesterday and was prescribed Robaxin in addition to gabapentin which she took today before work which she still having extreme pain.  Patient denies any fevers, chills, bladder or bowel incontinence, urinary retention, saddle anesthesia, history of IV drug use, history of prior back procedures or surgeries, fall, trauma, recent hospitalization, recent illness, or other acute symptoms or concerns.  She states that he did order her an MRI to be obtained as an outpatient.     Nursing Notes were all reviewed and agreed with or any disagreements were addressed in the HPI.    REVIEW OF SYSTEMS :      Review of Systems    Positives and Pertinent negatives

## 2024-03-27 NOTE — ED NOTES
Patient  called for re-eval.  No answer, not found in waiting room, restroom or parking lot.  Patient did not notify staff she was leaving.

## 2024-04-05 ENCOUNTER — APPOINTMENT (OUTPATIENT)
Dept: GENERAL RADIOLOGY | Age: 54
End: 2024-04-05
Payer: COMMERCIAL

## 2024-04-05 ENCOUNTER — HOSPITAL ENCOUNTER (EMERGENCY)
Age: 54
Discharge: HOME | End: 2024-04-05
Payer: COMMERCIAL

## 2024-04-05 VITALS
TEMPERATURE: 98 F | BODY MASS INDEX: 38.41 KG/M2 | HEART RATE: 87 BPM | SYSTOLIC BLOOD PRESSURE: 130 MMHG | DIASTOLIC BLOOD PRESSURE: 88 MMHG | RESPIRATION RATE: 16 BRPM | OXYGEN SATURATION: 96 % | WEIGHT: 210 LBS

## 2024-04-05 DIAGNOSIS — G89.29 ACUTE EXACERBATION OF CHRONIC LOW BACK PAIN: Primary | ICD-10-CM

## 2024-04-05 DIAGNOSIS — M54.50 ACUTE EXACERBATION OF CHRONIC LOW BACK PAIN: Primary | ICD-10-CM

## 2024-04-05 PROCEDURE — 6370000000 HC RX 637 (ALT 250 FOR IP): Performed by: PHYSICIAN ASSISTANT

## 2024-04-05 PROCEDURE — 99283 EMERGENCY DEPT VISIT LOW MDM: CPT

## 2024-04-05 RX ORDER — ONDANSETRON 4 MG/1
4 TABLET, ORALLY DISINTEGRATING ORAL ONCE
Status: COMPLETED | OUTPATIENT
Start: 2024-04-05 | End: 2024-04-05

## 2024-04-05 RX ORDER — OXYCODONE HYDROCHLORIDE AND ACETAMINOPHEN 5; 325 MG/1; MG/1
2 TABLET ORAL ONCE
Status: COMPLETED | OUTPATIENT
Start: 2024-04-05 | End: 2024-04-05

## 2024-04-05 RX ORDER — ONDANSETRON 4 MG/1
4 TABLET, ORALLY DISINTEGRATING ORAL 3 TIMES DAILY PRN
Qty: 21 TABLET | Refills: 0 | Status: SHIPPED | OUTPATIENT
Start: 2024-04-05

## 2024-04-05 RX ORDER — OXYCODONE HYDROCHLORIDE AND ACETAMINOPHEN 5; 325 MG/1; MG/1
1 TABLET ORAL EVERY 6 HOURS PRN
Qty: 12 TABLET | Refills: 0 | Status: SHIPPED | OUTPATIENT
Start: 2024-04-05 | End: 2024-04-08

## 2024-04-05 RX ADMIN — ONDANSETRON 4 MG: 4 TABLET, ORALLY DISINTEGRATING ORAL at 16:09

## 2024-04-05 RX ADMIN — OXYCODONE HYDROCHLORIDE AND ACETAMINOPHEN 2 TABLET: 5; 325 TABLET ORAL at 15:43

## 2024-04-05 ASSESSMENT — PAIN DESCRIPTION - ONSET: ONSET: ON-GOING

## 2024-04-05 ASSESSMENT — PAIN DESCRIPTION - ORIENTATION
ORIENTATION: LOWER
ORIENTATION: LOWER

## 2024-04-05 ASSESSMENT — PAIN DESCRIPTION - LOCATION
LOCATION: BACK
LOCATION: BACK;COCCYX

## 2024-04-05 ASSESSMENT — PAIN SCALES - GENERAL
PAINLEVEL_OUTOF10: 10
PAINLEVEL_OUTOF10: 10

## 2024-04-05 ASSESSMENT — PAIN DESCRIPTION - DESCRIPTORS
DESCRIPTORS: ACHING;DISCOMFORT;SORE;SHARP;SHOOTING
DESCRIPTORS: ACHING;DULL;DISCOMFORT

## 2024-04-05 ASSESSMENT — PAIN DESCRIPTION - PAIN TYPE: TYPE: ACUTE PAIN

## 2024-04-05 ASSESSMENT — PAIN DESCRIPTION - FREQUENCY: FREQUENCY: CONTINUOUS

## 2024-04-05 ASSESSMENT — PAIN - FUNCTIONAL ASSESSMENT: PAIN_FUNCTIONAL_ASSESSMENT: 0-10

## 2024-04-05 NOTE — ED PROVIDER NOTES
Independent NEL Visit.    HPI:  4/5/24,   Time: 3:34 PM EDT         Lesley Dumont is a 53 y.o. female with past medical history of hypertension, obesity, chronic back pain and diabetes presenting to the ED for acute exacerbation of her chronic back pain.  She has had back pain for several years.  Most recent CAT scan was done 1 month ago.  No new injury or trauma since then.  She is seeing neurosurgery, .  He has scheduled her for an MRI on Monday and then will decide if he is going to do surgery or not.  She is currently tried lidocaine patches, steroids, muscle relaxers.  No relief.  She denies fever, chills, body aches, headache, dizziness, syncope, chest pain, shortness of breath, abdominal pain, nausea, vomiting, diarrhea, dysuria, hematuria, saddle anesthesia, bladder or bowel continence or numbness and tingling down extremities.    ROS:   Pertinent positives and negatives are stated within HPI, all other systems reviewed and are negative.  --------------------------------------------- PAST HISTORY ---------------------------------------------  Past Medical History:  has a past medical history of Asthma, Cellulitis, Diverticulitis, Diverticulosis, HTN (hypertension), and Hyperlipidemia.    Past Surgical History:  has a past surgical history that includes Tooth Extraction (2011); lymph node dissection (2010); and Tonsillectomy.    Social History:  reports that she has been smoking cigarettes. She started smoking about 19 years ago. She has a 5.0 pack-year smoking history. She has never used smokeless tobacco. She reports current alcohol use. She reports that she does not currently use drugs after having used the following drugs: Cocaine.    Family History: family history is not on file.     The patient’s home medications have been reviewed.    Allergies: Amoxicillin    -------------------------------------------------- RESULTS -------------------------------------------------  All laboratory and

## 2024-04-08 ENCOUNTER — HOSPITAL ENCOUNTER (OUTPATIENT)
Dept: MRI IMAGING | Age: 54
Discharge: HOME | End: 2024-04-10
Payer: COMMERCIAL

## 2024-04-08 DIAGNOSIS — M54.42 CHRONIC BILATERAL LOW BACK PAIN WITH BILATERAL SCIATICA: ICD-10-CM

## 2024-04-08 DIAGNOSIS — M54.41 CHRONIC BILATERAL LOW BACK PAIN WITH BILATERAL SCIATICA: ICD-10-CM

## 2024-04-08 DIAGNOSIS — G89.29 CHRONIC BILATERAL LOW BACK PAIN WITH BILATERAL SCIATICA: ICD-10-CM

## 2024-04-08 PROCEDURE — 72148 MRI LUMBAR SPINE W/O DYE: CPT

## 2024-04-17 ENCOUNTER — OFFICE VISIT (OUTPATIENT)
Dept: NEUROSURGERY | Age: 54
End: 2024-04-17
Payer: COMMERCIAL

## 2024-04-17 DIAGNOSIS — M54.16 LUMBAR RADICULOPATHY: ICD-10-CM

## 2024-04-17 DIAGNOSIS — G89.29 CHRONIC BILATERAL LOW BACK PAIN WITH BILATERAL SCIATICA: Primary | ICD-10-CM

## 2024-04-17 DIAGNOSIS — M54.41 CHRONIC BILATERAL LOW BACK PAIN WITH BILATERAL SCIATICA: Primary | ICD-10-CM

## 2024-04-17 DIAGNOSIS — M47.816 LUMBAR SPONDYLOSIS: ICD-10-CM

## 2024-04-17 DIAGNOSIS — M54.42 CHRONIC BILATERAL LOW BACK PAIN WITH BILATERAL SCIATICA: Primary | ICD-10-CM

## 2024-04-17 PROCEDURE — 99213 OFFICE O/P EST LOW 20 MIN: CPT | Performed by: PHYSICIAN ASSISTANT

## 2024-04-17 RX ORDER — LIDOCAINE PAIN RELIEF 40 MG/1000MG
1 PATCH TOPICAL DAILY
COMMUNITY
Start: 2024-04-10

## 2024-04-17 RX ORDER — BACLOFEN 10 MG/1
10 TABLET ORAL 3 TIMES DAILY
Qty: 40 TABLET | Refills: 1 | Status: SHIPPED | OUTPATIENT
Start: 2024-04-17

## 2024-04-17 RX ORDER — HYDROCODONE BITARTRATE AND ACETAMINOPHEN 5; 325 MG/1; MG/1
1 TABLET ORAL EVERY 6 HOURS PRN
COMMUNITY
Start: 2024-04-09

## 2024-04-17 NOTE — PROGRESS NOTES
Office Follow-up Visit     Subjective: Lesley Dumont is a 53 year old female who presented 3/2/5/24 for acute on chronic low back pain with radiation down her right leg. Describes the pain as sharp and stabbing. Admits to numbness in her right leg. Walking and using steps make the symptoms worse. She has tried ibuprofen, naproxen, gabapentin, lidocaine patches, steroids, chiropractor, and at home exercise program without significant lasting relief. Denies recent KAMILA. Updated MRI ordered at that time.    She presents to the office today to review imaging. Pain remains the same, no better or worse. Robaxin is not helping. Denies loss of bowel or bladder, saddle anesthesia, headache, loss of dexterity, abnormal gait, fever, chills, N/V, SOB, or chest pain.     Of note, patient smokes 1/2 ppd     Physical Exam:              WDWN, no apparent distress              Non-labored breathing               Vitals Stable              Alert and oriented x3              CN 3-12 intact              PERRL              EOMI              RLE 4/5 strength  Sensation intact to light touch    TTP lumbar spine                 Imaging: MRI lumbar spine 4/8/24: IMPRESSION:  1. No fracture or bony destructive changes.  2. Moderate central canal stenosis at L4-5.  3. Multilevel neural foraminal stenoses, worst (moderate) at the bilateral  L3-4, bilateral L4-5 and left L5-S1 levels.     Assessment: This is a 53 y.o.  female presenting for a follow-up to review imaging     Plan:  -MRI reviewed and discussed with patient in detail  -Patient has not exhausted all conservative treatments. Referral for PT/massage/dry needling printed and given to patient.  -Referral to pain management for injections placed  -Smoking cessation   -Robaxin switched to baclofen  -Lumbar flex/ex XR   -OARRS report reviewed   -Follow-up in neurosurgery clinic if fail conservative treatments with Dr. Nelson to discuss surgical options   -Call or return to neurosurgery

## 2024-05-09 ENCOUNTER — APPOINTMENT (OUTPATIENT)
Dept: GENERAL RADIOLOGY | Age: 54
End: 2024-05-09
Payer: COMMERCIAL

## 2024-05-09 ENCOUNTER — HOSPITAL ENCOUNTER (EMERGENCY)
Age: 54
Discharge: HOME | End: 2024-05-09
Payer: COMMERCIAL

## 2024-05-09 VITALS
HEART RATE: 65 BPM | BODY MASS INDEX: 36.99 KG/M2 | WEIGHT: 201 LBS | RESPIRATION RATE: 18 BRPM | OXYGEN SATURATION: 95 % | HEIGHT: 62 IN | TEMPERATURE: 98.5 F | SYSTOLIC BLOOD PRESSURE: 104 MMHG | DIASTOLIC BLOOD PRESSURE: 73 MMHG

## 2024-05-09 DIAGNOSIS — M10.471 OTHER SECONDARY ACUTE GOUT OF RIGHT FOOT: Primary | ICD-10-CM

## 2024-05-09 DIAGNOSIS — M79.671 RIGHT FOOT PAIN: ICD-10-CM

## 2024-05-09 LAB
ALBUMIN SERPL-MCNC: 4 G/DL (ref 3.5–5.2)
ALP SERPL-CCNC: 77 U/L (ref 35–104)
ALT SERPL-CCNC: 15 U/L (ref 0–32)
ANION GAP SERPL CALCULATED.3IONS-SCNC: 10 MMOL/L (ref 7–16)
AST SERPL-CCNC: 16 U/L (ref 0–31)
BASOPHILS # BLD: 0.03 K/UL (ref 0–0.2)
BASOPHILS NFR BLD: 0 % (ref 0–2)
BILIRUB SERPL-MCNC: 0.3 MG/DL (ref 0–1.2)
BUN SERPL-MCNC: 6 MG/DL (ref 6–20)
CALCIUM SERPL-MCNC: 9.2 MG/DL (ref 8.6–10.2)
CHLORIDE SERPL-SCNC: 101 MMOL/L (ref 98–107)
CO2 SERPL-SCNC: 26 MMOL/L (ref 22–29)
CREAT SERPL-MCNC: 0.7 MG/DL (ref 0.5–1)
CRP SERPL HS-MCNC: 24 MG/L (ref 0–5)
EOSINOPHIL # BLD: 0.21 K/UL (ref 0.05–0.5)
EOSINOPHILS RELATIVE PERCENT: 2 % (ref 0–6)
ERYTHROCYTE [DISTWIDTH] IN BLOOD BY AUTOMATED COUNT: 13.2 % (ref 11.5–15)
ERYTHROCYTE [SEDIMENTATION RATE] IN BLOOD BY WESTERGREN METHOD: 2 MM/HR (ref 0–20)
GFR, ESTIMATED: >90 ML/MIN/1.73M2
GLUCOSE SERPL-MCNC: 142 MG/DL (ref 74–99)
HCT VFR BLD AUTO: 43.2 % (ref 34–48)
HGB BLD-MCNC: 14.4 G/DL (ref 11.5–15.5)
IMM GRANULOCYTES # BLD AUTO: 0.05 K/UL (ref 0–0.58)
IMM GRANULOCYTES NFR BLD: 0 % (ref 0–5)
LACTATE BLDV-SCNC: 1.8 MMOL/L (ref 0.5–2.2)
LYMPHOCYTES NFR BLD: 1.52 K/UL (ref 1.5–4)
LYMPHOCYTES RELATIVE PERCENT: 13 % (ref 20–42)
MCH RBC QN AUTO: 29.2 PG (ref 26–35)
MCHC RBC AUTO-ENTMCNC: 33.3 G/DL (ref 32–34.5)
MCV RBC AUTO: 87.6 FL (ref 80–99.9)
MONOCYTES NFR BLD: 0.64 K/UL (ref 0.1–0.95)
MONOCYTES NFR BLD: 6 % (ref 2–12)
NEUTROPHILS NFR BLD: 79 % (ref 43–80)
NEUTS SEG NFR BLD: 9.24 K/UL (ref 1.8–7.3)
PLATELET CONFIRMATION: NORMAL
PLATELET, FLUORESCENCE: 277 K/UL (ref 130–450)
PMV BLD AUTO: 10.2 FL (ref 7–12)
POTASSIUM SERPL-SCNC: 3.6 MMOL/L (ref 3.5–5)
PROT SERPL-MCNC: 6.4 G/DL (ref 6.4–8.3)
RBC # BLD AUTO: 4.93 M/UL (ref 3.5–5.5)
SODIUM SERPL-SCNC: 137 MMOL/L (ref 132–146)
URATE SERPL-MCNC: 7.9 MG/DL (ref 2.4–5.7)
WBC OTHER # BLD: 11.7 K/UL (ref 4.5–11.5)

## 2024-05-09 PROCEDURE — 86140 C-REACTIVE PROTEIN: CPT

## 2024-05-09 PROCEDURE — 6360000002 HC RX W HCPCS

## 2024-05-09 PROCEDURE — 85025 COMPLETE CBC W/AUTO DIFF WBC: CPT

## 2024-05-09 PROCEDURE — 73630 X-RAY EXAM OF FOOT: CPT

## 2024-05-09 PROCEDURE — 6370000000 HC RX 637 (ALT 250 FOR IP)

## 2024-05-09 PROCEDURE — 96374 THER/PROPH/DIAG INJ IV PUSH: CPT

## 2024-05-09 PROCEDURE — 73610 X-RAY EXAM OF ANKLE: CPT

## 2024-05-09 PROCEDURE — 80053 COMPREHEN METABOLIC PANEL: CPT

## 2024-05-09 PROCEDURE — 85652 RBC SED RATE AUTOMATED: CPT

## 2024-05-09 PROCEDURE — 99284 EMERGENCY DEPT VISIT MOD MDM: CPT

## 2024-05-09 PROCEDURE — 83605 ASSAY OF LACTIC ACID: CPT

## 2024-05-09 PROCEDURE — 84550 ASSAY OF BLOOD/URIC ACID: CPT

## 2024-05-09 RX ORDER — OXYCODONE HYDROCHLORIDE AND ACETAMINOPHEN 5; 325 MG/1; MG/1
1 TABLET ORAL ONCE
Status: COMPLETED | OUTPATIENT
Start: 2024-05-09 | End: 2024-05-09

## 2024-05-09 RX ORDER — COLCHICINE 0.6 MG/1
0.6 TABLET ORAL 2 TIMES DAILY
Qty: 20 TABLET | Refills: 0 | Status: SHIPPED | OUTPATIENT
Start: 2024-05-09

## 2024-05-09 RX ORDER — KETOROLAC TROMETHAMINE 30 MG/ML
30 INJECTION, SOLUTION INTRAMUSCULAR; INTRAVENOUS ONCE
Status: COMPLETED | OUTPATIENT
Start: 2024-05-09 | End: 2024-05-09

## 2024-05-09 RX ORDER — COLCHICINE 0.6 MG/1
1.2 TABLET ORAL ONCE
Status: COMPLETED | OUTPATIENT
Start: 2024-05-09 | End: 2024-05-09

## 2024-05-09 RX ADMIN — KETOROLAC TROMETHAMINE 30 MG: 30 INJECTION, SOLUTION INTRAMUSCULAR at 01:24

## 2024-05-09 RX ADMIN — COLCHICINE 1.2 MG: 0.6 TABLET ORAL at 02:39

## 2024-05-09 RX ADMIN — OXYCODONE HYDROCHLORIDE AND ACETAMINOPHEN 1 TABLET: 5; 325 TABLET ORAL at 00:35

## 2024-05-09 RX ADMIN — OXYCODONE HYDROCHLORIDE AND ACETAMINOPHEN 1 TABLET: 5; 325 TABLET ORAL at 02:39

## 2024-05-09 ASSESSMENT — PAIN SCALES - GENERAL: PAINLEVEL_OUTOF10: 10

## 2024-05-09 NOTE — ED PROVIDER NOTES
Independent NEL Visit      [facility]  Department of Emergency Medicine   ED  Encounter Note  Admit Date/RoomTime: 2024 12:37 AM  ED Room:     NAME: Lesley Dumont  : 1970  MRN: [id number]     Chief Complaint:  Foot Pain (Pain/swelling- right foot swelling started yesterday and increasing. Denies any injury )    History of Present Illness   History provided by the patient and chart review.    Lesley Dumont is a 53 y.o. old female with a history of gout, cellulitis, type 2 diabetes mellitus, hypertension, hyperlipidemia, asthma, diverticulitis, cellulitis, and opioid and cocaine abuse presenting to the emergency department by private vehicle, for non-traumatic Right foot pain which occured 1 day(s) prior to arrival.  The complaint is due to no known cause.  She reportedly awakened with redness and pain in the first MTP joint of the right foot.  Since then pain has gradually worsened and become significantly worse.  She does have a history of gout, but has never been this bad.  She does endorse nausea since the pain has been more severe.  Since onset the symptoms have been persistent with inability to bear weight, pain at the anterior aspect of the ankle, redness, and swelling.  Her pain is aggraveated by any movement, pressure on or palpation of painful area, or any attempt to bear weight and relieved by nothing.  She denies any back pain, extremity injury, numbness, weakness, vomiting, fever, chills, wounds, rash, lymphatic streaking, or coolness to the extremity.  She has tried ice and elevation without improvement.  She states she cannot stand to have anything touch the foot.  Tetanus Status: within past 10 years.      ROS   Pertinent positives and negatives are stated within HPI, all other systems reviewed and are negative.    Past Medical History:  has a past medical history of Asthma, Cellulitis, Diverticulitis, Diverticulosis, HTN (hypertension), and
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
